# Patient Record
Sex: MALE | Race: BLACK OR AFRICAN AMERICAN | Employment: STUDENT | URBAN - METROPOLITAN AREA
[De-identification: names, ages, dates, MRNs, and addresses within clinical notes are randomized per-mention and may not be internally consistent; named-entity substitution may affect disease eponyms.]

---
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
## 2025-02-22 ENCOUNTER — HOSPITAL ENCOUNTER (EMERGENCY)
Age: 20
Discharge: HOME | End: 2025-02-22
Attending: EMERGENCY MEDICINE
Payer: COMMERCIAL

## 2025-02-22 VITALS
BODY MASS INDEX: 24.67 KG/M2 | HEART RATE: 66 BPM | RESPIRATION RATE: 20 BRPM | HEIGHT: 75 IN | SYSTOLIC BLOOD PRESSURE: 102 MMHG | WEIGHT: 198.41 LBS | OXYGEN SATURATION: 100 % | TEMPERATURE: 98.4 F | DIASTOLIC BLOOD PRESSURE: 70 MMHG

## 2025-02-22 DIAGNOSIS — M54.41 ACUTE RIGHT-SIDED LOW BACK PAIN WITH RIGHT-SIDED SCIATICA: Primary | ICD-10-CM

## 2025-02-22 PROCEDURE — 6370000000 HC RX 637 (ALT 250 FOR IP): Performed by: EMERGENCY MEDICINE

## 2025-02-22 PROCEDURE — 96372 THER/PROPH/DIAG INJ SC/IM: CPT

## 2025-02-22 PROCEDURE — 99284 EMERGENCY DEPT VISIT MOD MDM: CPT

## 2025-02-22 PROCEDURE — 6360000002 HC RX W HCPCS: Performed by: EMERGENCY MEDICINE

## 2025-02-22 RX ORDER — LIDOCAINE 4 G/G
1 PATCH TOPICAL DAILY
Qty: 30 PATCH | Refills: 0 | Status: SHIPPED | OUTPATIENT
Start: 2025-02-22 | End: 2025-03-24

## 2025-02-22 RX ORDER — KETOROLAC TROMETHAMINE 10 MG/1
10 TABLET, FILM COATED ORAL EVERY 6 HOURS PRN
Qty: 20 TABLET | Refills: 0 | Status: ON HOLD | OUTPATIENT
Start: 2025-02-22 | End: 2025-02-28 | Stop reason: HOSPADM

## 2025-02-22 RX ORDER — CYCLOBENZAPRINE HCL 10 MG
10 TABLET ORAL 3 TIMES DAILY PRN
Qty: 21 TABLET | Refills: 0 | Status: SHIPPED | OUTPATIENT
Start: 2025-02-22 | End: 2025-03-04

## 2025-02-22 RX ORDER — LIDOCAINE 4 G/G
1 PATCH TOPICAL DAILY
Status: DISCONTINUED | OUTPATIENT
Start: 2025-02-22 | End: 2025-02-23 | Stop reason: HOSPADM

## 2025-02-22 RX ORDER — LIDOCAINE 4 G/G
1 PATCH TOPICAL DAILY
Status: DISCONTINUED | OUTPATIENT
Start: 2025-02-23 | End: 2025-02-22

## 2025-02-22 RX ORDER — KETOROLAC TROMETHAMINE 30 MG/ML
30 INJECTION, SOLUTION INTRAMUSCULAR; INTRAVENOUS ONCE
Status: COMPLETED | OUTPATIENT
Start: 2025-02-22 | End: 2025-02-22

## 2025-02-22 RX ORDER — ORPHENADRINE CITRATE 30 MG/ML
60 INJECTION INTRAMUSCULAR; INTRAVENOUS ONCE
Status: COMPLETED | OUTPATIENT
Start: 2025-02-22 | End: 2025-02-22

## 2025-02-22 RX ADMIN — KETOROLAC TROMETHAMINE 30 MG: 30 INJECTION, SOLUTION INTRAMUSCULAR at 22:12

## 2025-02-22 RX ADMIN — ORPHENADRINE CITRATE 60 MG: 60 INJECTION INTRAMUSCULAR; INTRAVENOUS at 22:11

## 2025-02-22 ASSESSMENT — PAIN DESCRIPTION - ORIENTATION
ORIENTATION: LOWER
ORIENTATION: LOWER

## 2025-02-22 ASSESSMENT — PAIN - FUNCTIONAL ASSESSMENT: PAIN_FUNCTIONAL_ASSESSMENT: 0-10

## 2025-02-22 ASSESSMENT — PAIN DESCRIPTION - LOCATION
LOCATION: BACK
LOCATION: BACK

## 2025-02-22 ASSESSMENT — PAIN SCALES - GENERAL
PAINLEVEL_OUTOF10: 8
PAINLEVEL_OUTOF10: 10
PAINLEVEL_OUTOF10: 10

## 2025-02-22 ASSESSMENT — PAIN DESCRIPTION - DESCRIPTORS: DESCRIPTORS: SHARP;STABBING

## 2025-02-23 NOTE — ED TRIAGE NOTES
Patient to ED for lower back pain.  Patient is alert and oriented with GCS 15.  Patient reports pain x 3 days after playing basketball, denies any injury during play.  Patient complains that pain is in lower back and radiates down his left leg, no loss of bladder or bowel control since onset.  Patient is unable to describe the pain but rates it \"1000\" on 1/10 scale.  Patient also reports inability to stand or ambulate, but denies any other complaints.

## 2025-02-23 NOTE — ED PROVIDER NOTES
low back pain with right-sided sciatica        Blood pressure 102/70, pulse 66, temperature 98.4 °F (36.9 °C), temperature source Oral, resp. rate 20, height 1.905 m (6' 3\"), weight 90 kg (198 lb 6.6 oz), SpO2 100%.     DISPOSITION  DISPOSITION Decision To Discharge 02/22/2025 10:37:38 PM   DISPOSITION CONDITION Stable         This chart was generated using the Dragon dictation system. I created this record but it may contain dictation errors given the limitations of this technology.        Merlin Michel MD  02/22/25 1220

## 2025-02-25 ENCOUNTER — HOSPITAL ENCOUNTER (EMERGENCY)
Age: 20
Discharge: ANOTHER HOSPITAL | End: 2025-02-26
Attending: STUDENT IN AN ORGANIZED HEALTH CARE EDUCATION/TRAINING PROGRAM

## 2025-02-25 DIAGNOSIS — R20.2 PARESTHESIA OF SADDLE AREA: ICD-10-CM

## 2025-02-25 DIAGNOSIS — R20.2 BILATERAL LEG PARESTHESIA: Primary | ICD-10-CM

## 2025-02-25 PROCEDURE — 6360000002 HC RX W HCPCS: Performed by: STUDENT IN AN ORGANIZED HEALTH CARE EDUCATION/TRAINING PROGRAM

## 2025-02-25 PROCEDURE — 99285 EMERGENCY DEPT VISIT HI MDM: CPT

## 2025-02-25 RX ORDER — DEXAMETHASONE SODIUM PHOSPHATE 4 MG/ML
8 INJECTION, SOLUTION INTRA-ARTICULAR; INTRALESIONAL; INTRAMUSCULAR; INTRAVENOUS; SOFT TISSUE ONCE
Status: COMPLETED | OUTPATIENT
Start: 2025-02-25 | End: 2025-02-25

## 2025-02-25 RX ADMIN — DEXAMETHASONE SODIUM PHOSPHATE 8 MG: 4 INJECTION INTRA-ARTICULAR; INTRALESIONAL; INTRAMUSCULAR; INTRAVENOUS; SOFT TISSUE at 23:48

## 2025-02-25 ASSESSMENT — PAIN DESCRIPTION - PAIN TYPE: TYPE: ACUTE PAIN

## 2025-02-25 ASSESSMENT — PAIN DESCRIPTION - FREQUENCY: FREQUENCY: CONTINUOUS

## 2025-02-25 ASSESSMENT — PAIN - FUNCTIONAL ASSESSMENT
PAIN_FUNCTIONAL_ASSESSMENT: 0-10
PAIN_FUNCTIONAL_ASSESSMENT: PREVENTS OR INTERFERES SOME ACTIVE ACTIVITIES AND ADLS

## 2025-02-25 ASSESSMENT — PAIN DESCRIPTION - ONSET: ONSET: ON-GOING

## 2025-02-25 ASSESSMENT — PAIN DESCRIPTION - LOCATION: LOCATION: LEG

## 2025-02-25 ASSESSMENT — PAIN DESCRIPTION - ORIENTATION: ORIENTATION: LEFT;RIGHT

## 2025-02-25 ASSESSMENT — PAIN DESCRIPTION - DESCRIPTORS: DESCRIPTORS: SHARP

## 2025-02-25 ASSESSMENT — PAIN SCALES - GENERAL: PAINLEVEL_OUTOF10: 10

## 2025-02-26 ENCOUNTER — APPOINTMENT (OUTPATIENT)
Dept: MRI IMAGING | Age: 20
End: 2025-02-26

## 2025-02-26 ENCOUNTER — APPOINTMENT (OUTPATIENT)
Dept: MRI IMAGING | Age: 20
End: 2025-02-26
Attending: INTERNAL MEDICINE
Payer: COMMERCIAL

## 2025-02-26 ENCOUNTER — HOSPITAL ENCOUNTER (INPATIENT)
Age: 20
LOS: 2 days | Discharge: HOME | End: 2025-02-28
Attending: INTERNAL MEDICINE | Admitting: INTERNAL MEDICINE
Payer: COMMERCIAL

## 2025-02-26 VITALS
OXYGEN SATURATION: 100 % | DIASTOLIC BLOOD PRESSURE: 56 MMHG | HEIGHT: 74 IN | WEIGHT: 195.99 LBS | HEART RATE: 57 BPM | BODY MASS INDEX: 25.15 KG/M2 | RESPIRATION RATE: 18 BRPM | SYSTOLIC BLOOD PRESSURE: 123 MMHG | TEMPERATURE: 98.4 F

## 2025-02-26 DIAGNOSIS — Z87.39 S/P DISCECTOMY FOR HERNIATED NUCLEUS PULPOSUS: Primary | ICD-10-CM

## 2025-02-26 DIAGNOSIS — Z98.890 S/P DISCECTOMY FOR HERNIATED NUCLEUS PULPOSUS: Primary | ICD-10-CM

## 2025-02-26 PROBLEM — M54.50 LOW BACK PAIN: Status: ACTIVE | Noted: 2025-02-26

## 2025-02-26 LAB
ABO + RH BLD: NORMAL
ANION GAP SERPL CALCULATED.3IONS-SCNC: 10 MMOL/L (ref 3–16)
ANION GAP SERPL CALCULATED.3IONS-SCNC: 12 MMOL/L (ref 3–16)
BACTERIA UR CULT: NORMAL
BACTERIA URNS QL MICRO: ABNORMAL /HPF
BASOPHILS # BLD: 0 K/UL (ref 0–0.2)
BASOPHILS # BLD: 0.1 K/UL (ref 0–0.2)
BASOPHILS NFR BLD: 0.2 %
BASOPHILS NFR BLD: 1.2 %
BILIRUB UR QL STRIP.AUTO: NEGATIVE
BLD GP AB SCN SERPL QL: NORMAL
BUN SERPL-MCNC: 10 MG/DL (ref 7–20)
BUN SERPL-MCNC: 9 MG/DL (ref 7–20)
CALCIUM SERPL-MCNC: 9.5 MG/DL (ref 8.3–10.6)
CALCIUM SERPL-MCNC: 9.8 MG/DL (ref 8.3–10.6)
CHLORIDE SERPL-SCNC: 100 MMOL/L (ref 99–110)
CHLORIDE SERPL-SCNC: 101 MMOL/L (ref 99–110)
CLARITY UR: CLEAR
CO2 SERPL-SCNC: 24 MMOL/L (ref 21–32)
CO2 SERPL-SCNC: 27 MMOL/L (ref 21–32)
COLOR UR: YELLOW
CREAT SERPL-MCNC: 0.8 MG/DL (ref 0.9–1.3)
CREAT SERPL-MCNC: 0.8 MG/DL (ref 0.9–1.3)
DEPRECATED RDW RBC AUTO: 13 % (ref 12.4–15.4)
DEPRECATED RDW RBC AUTO: 13.3 % (ref 12.4–15.4)
EOSINOPHIL # BLD: 0 K/UL (ref 0–0.6)
EOSINOPHIL # BLD: 0.2 K/UL (ref 0–0.6)
EOSINOPHIL NFR BLD: 0.1 %
EOSINOPHIL NFR BLD: 3.4 %
EPI CELLS #/AREA URNS AUTO: 3 /HPF (ref 0–5)
GFR SERPLBLD CREATININE-BSD FMLA CKD-EPI: >90 ML/MIN/{1.73_M2}
GFR SERPLBLD CREATININE-BSD FMLA CKD-EPI: >90 ML/MIN/{1.73_M2}
GLUCOSE SERPL-MCNC: 100 MG/DL (ref 70–99)
GLUCOSE SERPL-MCNC: 134 MG/DL (ref 70–99)
GLUCOSE UR STRIP.AUTO-MCNC: NEGATIVE MG/DL
HCT VFR BLD AUTO: 44.4 % (ref 40.5–52.5)
HCT VFR BLD AUTO: 48.1 % (ref 40.5–52.5)
HGB BLD-MCNC: 15.1 G/DL (ref 13.5–17.5)
HGB BLD-MCNC: 16.2 G/DL (ref 13.5–17.5)
HGB UR QL STRIP.AUTO: NEGATIVE
HYALINE CASTS #/AREA URNS AUTO: 2 /LPF (ref 0–8)
INR PPP: 1.05 (ref 0.85–1.15)
KETONES UR STRIP.AUTO-MCNC: ABNORMAL MG/DL
LEUKOCYTE ESTERASE UR QL STRIP.AUTO: ABNORMAL
LYMPHOCYTES # BLD: 0.8 K/UL (ref 1–5.1)
LYMPHOCYTES # BLD: 2.2 K/UL (ref 1–5.1)
LYMPHOCYTES NFR BLD: 11.1 %
LYMPHOCYTES NFR BLD: 35 %
MAGNESIUM SERPL-MCNC: 2.31 MG/DL (ref 1.8–2.4)
MCH RBC QN AUTO: 30.6 PG (ref 26–34)
MCH RBC QN AUTO: 30.6 PG (ref 26–34)
MCHC RBC AUTO-ENTMCNC: 33.7 G/DL (ref 31–36)
MCHC RBC AUTO-ENTMCNC: 34 G/DL (ref 31–36)
MCV RBC AUTO: 89.9 FL (ref 80–100)
MCV RBC AUTO: 90.7 FL (ref 80–100)
MONOCYTES # BLD: 0.1 K/UL (ref 0–1.3)
MONOCYTES # BLD: 0.5 K/UL (ref 0–1.3)
MONOCYTES NFR BLD: 2.1 %
MONOCYTES NFR BLD: 8.3 %
NEUTROPHILS # BLD: 3.3 K/UL (ref 1.7–7.7)
NEUTROPHILS # BLD: 6 K/UL (ref 1.7–7.7)
NEUTROPHILS NFR BLD: 52.1 %
NEUTROPHILS NFR BLD: 86.5 %
NITRITE UR QL STRIP.AUTO: NEGATIVE
PH UR STRIP.AUTO: 5.5 [PH] (ref 5–8)
PLATELET # BLD AUTO: 303 K/UL (ref 135–450)
PLATELET # BLD AUTO: 316 K/UL (ref 135–450)
PMV BLD AUTO: 8.7 FL (ref 5–10.5)
PMV BLD AUTO: 8.8 FL (ref 5–10.5)
POTASSIUM SERPL-SCNC: 3.5 MMOL/L (ref 3.5–5.1)
POTASSIUM SERPL-SCNC: 4 MMOL/L (ref 3.5–5.1)
PROT UR STRIP.AUTO-MCNC: 30 MG/DL
PROTHROMBIN TIME: 13.9 SEC (ref 11.9–14.9)
RBC # BLD AUTO: 4.93 M/UL (ref 4.2–5.9)
RBC # BLD AUTO: 5.3 M/UL (ref 4.2–5.9)
RBC CLUMPS #/AREA URNS AUTO: 1 /HPF (ref 0–4)
SODIUM SERPL-SCNC: 137 MMOL/L (ref 136–145)
SODIUM SERPL-SCNC: 137 MMOL/L (ref 136–145)
SP GR UR STRIP.AUTO: 1.02 (ref 1–1.03)
UA COMPLETE W REFLEX CULTURE PNL UR: YES
UA DIPSTICK W REFLEX MICRO PNL UR: YES
URN SPEC COLLECT METH UR: ABNORMAL
UROBILINOGEN UR STRIP-ACNC: 1 E.U./DL
WBC # BLD AUTO: 6.3 K/UL (ref 4–11)
WBC # BLD AUTO: 6.9 K/UL (ref 4–11)
WBC #/AREA URNS AUTO: 22 /HPF (ref 0–5)

## 2025-02-26 PROCEDURE — 96376 TX/PRO/DX INJ SAME DRUG ADON: CPT

## 2025-02-26 PROCEDURE — 81001 URINALYSIS AUTO W/SCOPE: CPT

## 2025-02-26 PROCEDURE — 80048 BASIC METABOLIC PNL TOTAL CA: CPT

## 2025-02-26 PROCEDURE — 36415 COLL VENOUS BLD VENIPUNCTURE: CPT

## 2025-02-26 PROCEDURE — 6360000002 HC RX W HCPCS: Performed by: NURSE PRACTITIONER

## 2025-02-26 PROCEDURE — 1200000000 HC SEMI PRIVATE

## 2025-02-26 PROCEDURE — 86850 RBC ANTIBODY SCREEN: CPT

## 2025-02-26 PROCEDURE — 87086 URINE CULTURE/COLONY COUNT: CPT

## 2025-02-26 PROCEDURE — 72148 MRI LUMBAR SPINE W/O DYE: CPT

## 2025-02-26 PROCEDURE — 85610 PROTHROMBIN TIME: CPT

## 2025-02-26 PROCEDURE — 85025 COMPLETE CBC W/AUTO DIFF WBC: CPT

## 2025-02-26 PROCEDURE — 6360000002 HC RX W HCPCS: Performed by: EMERGENCY MEDICINE

## 2025-02-26 PROCEDURE — 86901 BLOOD TYPING SEROLOGIC RH(D): CPT

## 2025-02-26 PROCEDURE — 83735 ASSAY OF MAGNESIUM: CPT

## 2025-02-26 PROCEDURE — 4500000002 HC ER NO CHARGE

## 2025-02-26 PROCEDURE — 86900 BLOOD TYPING SEROLOGIC ABO: CPT

## 2025-02-26 PROCEDURE — 96375 TX/PRO/DX INJ NEW DRUG ADDON: CPT

## 2025-02-26 PROCEDURE — APPNB180 APP NON BILLABLE TIME > 60 MINS: Performed by: NURSE PRACTITIONER

## 2025-02-26 PROCEDURE — 96374 THER/PROPH/DIAG INJ IV PUSH: CPT

## 2025-02-26 RX ORDER — KETOROLAC TROMETHAMINE 15 MG/ML
15 INJECTION, SOLUTION INTRAMUSCULAR; INTRAVENOUS ONCE
Status: COMPLETED | OUTPATIENT
Start: 2025-02-26 | End: 2025-02-26

## 2025-02-26 RX ORDER — HYDROMORPHONE HYDROCHLORIDE 1 MG/ML
0.5 INJECTION, SOLUTION INTRAMUSCULAR; INTRAVENOUS; SUBCUTANEOUS
Status: COMPLETED | OUTPATIENT
Start: 2025-02-26 | End: 2025-02-26

## 2025-02-26 RX ORDER — METHOCARBAMOL 750 MG/1
750 TABLET, FILM COATED ORAL EVERY 6 HOURS PRN
Status: DISCONTINUED | OUTPATIENT
Start: 2025-02-26 | End: 2025-02-27

## 2025-02-26 RX ORDER — HYDROMORPHONE HYDROCHLORIDE 1 MG/ML
0.5 INJECTION, SOLUTION INTRAMUSCULAR; INTRAVENOUS; SUBCUTANEOUS EVERY 4 HOURS PRN
Status: DISCONTINUED | OUTPATIENT
Start: 2025-02-26 | End: 2025-02-27

## 2025-02-26 RX ORDER — DEXAMETHASONE SODIUM PHOSPHATE 10 MG/ML
10 INJECTION, SOLUTION INTRAMUSCULAR; INTRAVENOUS ONCE
Status: COMPLETED | OUTPATIENT
Start: 2025-02-26 | End: 2025-02-26

## 2025-02-26 RX ADMIN — KETOROLAC TROMETHAMINE 15 MG: 15 INJECTION, SOLUTION INTRAMUSCULAR; INTRAVENOUS at 02:25

## 2025-02-26 RX ADMIN — DEXAMETHASONE SODIUM PHOSPHATE 10 MG: 10 INJECTION, SOLUTION INTRAMUSCULAR; INTRAVENOUS at 13:27

## 2025-02-26 RX ADMIN — HYDROMORPHONE HYDROCHLORIDE 0.75 MG: 1 INJECTION, SOLUTION INTRAMUSCULAR; INTRAVENOUS; SUBCUTANEOUS at 02:44

## 2025-02-26 RX ADMIN — HYDROMORPHONE HYDROCHLORIDE 0.5 MG: 1 INJECTION, SOLUTION INTRAMUSCULAR; INTRAVENOUS; SUBCUTANEOUS at 19:42

## 2025-02-26 RX ADMIN — HYDROMORPHONE HYDROCHLORIDE 0.5 MG: 1 INJECTION, SOLUTION INTRAMUSCULAR; INTRAVENOUS; SUBCUTANEOUS at 04:55

## 2025-02-26 RX ADMIN — HYDROMORPHONE HYDROCHLORIDE 0.5 MG: 1 INJECTION, SOLUTION INTRAMUSCULAR; INTRAVENOUS; SUBCUTANEOUS at 13:27

## 2025-02-26 ASSESSMENT — PAIN - FUNCTIONAL ASSESSMENT
PAIN_FUNCTIONAL_ASSESSMENT: ACTIVITIES ARE NOT PREVENTED
PAIN_FUNCTIONAL_ASSESSMENT: 0-10

## 2025-02-26 ASSESSMENT — PAIN SCALES - GENERAL
PAINLEVEL_OUTOF10: 0
PAINLEVEL_OUTOF10: 7
PAINLEVEL_OUTOF10: 10
PAINLEVEL_OUTOF10: 8
PAINLEVEL_OUTOF10: 2
PAINLEVEL_OUTOF10: 10

## 2025-02-26 ASSESSMENT — PAIN DESCRIPTION - ORIENTATION
ORIENTATION: LOWER;MID
ORIENTATION: RIGHT;LEFT
ORIENTATION: LOWER

## 2025-02-26 ASSESSMENT — PAIN DESCRIPTION - DESCRIPTORS
DESCRIPTORS: NUMBNESS
DESCRIPTORS: TINGLING

## 2025-02-26 ASSESSMENT — PAIN DESCRIPTION - LOCATION
LOCATION: BACK
LOCATION: BACK
LOCATION: LEG

## 2025-02-26 NOTE — DISCHARGE INSTRUCTIONS
Go directly to [facility] emergency department for MRI.  Do not stop at home.  Do not eat or drink prior to getting your test performed

## 2025-02-26 NOTE — ED PROVIDER NOTES
[facility] EMERGENCY DEPARTMENT      EMERGENCY MEDICINE     Pt Name: Cori Rojas  MRN: [id number]  Birthdate 2005  Date of evaluation: 2/25/2025  Provider: Ike Truong DO    CHIEF COMPLAINT       Chief Complaint   Patient presents with    Leg Pain     Pt c/o bilateral leg pain, numbness and tingling from waist to below the knee. Pt was here 3 days ago for back pain and was given muscle relaxer, and lidocaine patch. Pt states that he was not numb and had any tingling when he came 3 days ago. Pt rates pain 10/10 at this time.      HISTORY OF PRESENT ILLNESS   Cori Rojas is a 19 y.o. male who presents to the emergency department for bilateral paresthesias from his waist to his knees as well as inability to feel himself using the restroom.  Patient was seen approximately 3 days ago after having acute onset low back pain while lifting weights.  He was diagnosed with sciatica and had no red flag symptoms at that time.  He states that yesterday his symptoms started presenting the way that they were today.  He has been taking his multimodal's as indicated.  Denies any direct pain on the back.  States his legs feel like they are falling asleep.  No other medical history.  No other complaints at this time        PASTMEDICAL HISTORY     Past Medical History:   Diagnosis Date    Asthma     High risk sexual behavior        There is no problem list on file for this patient.    SURGICAL HISTORY     History reviewed. No pertinent surgical history.    CURRENT MEDICATIONS       Previous Medications    CYCLOBENZAPRINE (FLEXERIL) 10 MG TABLET    Take 1 tablet by mouth 3 times daily as needed for Muscle spasms    KETOROLAC (TORADOL) 10 MG TABLET    Take 1 tablet by mouth every 6 hours as needed for Pain    LIDOCAINE 4 % EXTERNAL PATCH    Place 1 patch onto the skin daily       ALLERGIES     is allergic to grass pollen(k-o-r-t-swt georgi) and shrimp extract.    FAMILY HISTORY     has no family status information on

## 2025-02-26 NOTE — ED TRIAGE NOTES
Pt c/o bilateral leg pain, numbness and tingling from waist to below the knee. Pt was here 3 days ago for back pain and was given muscle relaxer, and lidocaine patch. Pt states that he was not numb and had any tingling when he came 3 days ago. Pt rates pain 10/10 at this time. Pt is A&O x4 and ind. VSS at this time. Popliteal and femoral arteries are palpable at this time. Pt is on his way to [facility] for an MRI. Pt is being transported by an uber. No IV was placed.

## 2025-02-26 NOTE — CONSULTS
NEUROSURGERY CONSULT  MARK ANTHONY MAI  [id number]   2005 2/26/2025    Requesting physician: Sriram Antony MD    Reason for consultation: disc herniation     History of present illness: Patient is a 19 y.o. male w/ PMH of asthma who presented on 2/26/2025 with back pain and radiculopathy. Pain started last Wednesday after he played basketball and lifted weights. At that time he had midline back pain only. On Friday he played basketball again and noticed worsening of his pain. The pain started radiating to his buttocks and down the back of both of his legs, with numbness in the same distrubution. He was seen in the ED on 2/22 and given muscle relaxants and toradol which have provided him with little relief. He currently complains of back/leg pain worse when lying flat, he has numbness to buttocks, penis and the back of his legs which stops at his knees. He denies urinary or fecal incontinence. He came back for evaluation overnight, an MRI revealed large L5-S1 disc herniation w/ severe canal stenosis. Neurosurgery consulted for recommendations.     ROS:   GENERAL:  Denies fever or recent illness. Denies weight changes   EYES:  Denies vision change or diplopia  EARS:  Denies hearing loss  CARDIAC:  Denies chest pain  RESPIRATORY:  Denies shortness of breath  SKIN:  Denies rash or lesions   HEM:  Denies excessive bruising  PSYCH:  Denies anxiety or depression  NEURO: +numbness, +saddle anesthesia   :  Denies urinary difficulty  GI: Denies nausea, vomiting, diarrhea or constipation  MUSCULOSKELETAL:  +back pain, + leg pain     Allergies   Allergen Reactions    Grass Pollen(K-O-R-T-Swt Neel)     Shrimp Extract Hives       Past Medical History:   Diagnosis Date    Asthma     High risk sexual behavior         No past surgical history on file.    Social History     Occupational History    Not on file   Tobacco Use    Smoking status: Never    Smokeless tobacco: Never   Vaping Use    Vaping status: Never Used

## 2025-02-26 NOTE — ED PROVIDER NOTES
Emergency Department Encounter  Location: [facility] EMERGENCY DEPARTMENT    Patient: Cori Rojas  MRN: [id number]  : 2005  Date of evaluation: 2025  ED Provider: Han Verma MD    12:44 AM  25    Cori Rojas was checked out to me by Dr. Truong. Please see his/her initial documentation for details of the patient's initial ED presentation, physical exam and completed studies.    In brief, Cori Rojas is a 19 y.o. male that presented to the emergency department for evaluation of paresthesias and sensory deficits to the bilateral lower extremities.  Reports injuring his back 3 days previously lifting heavy objects.  He states that he is developed what he describes as paresthesias or pins-and-needles and a girdle like sensation with pain in his buttocks and the posterior legs.  Denies any weakness and states he is ambulate difficulties.  He denies incontinence of bowel or urine but states that \"everything feels asleep down there\".  Patient sent this facility for MRI to rule out cauda equina .    I have reviewed and interpreted all of the currently available lab results and diagnostics from this visit:  Results for orders placed or performed during the hospital encounter of 25   Culture, Urine    Specimen: Urine, clean catch   Result Value Ref Range    Urine Culture, Routine No growth at 18 to 36 hours    CBC with Auto Differential   Result Value Ref Range    WBC 6.3 4.0 - 11.0 K/uL    RBC 4.93 4.20 - 5.90 M/uL    Hemoglobin 15.1 13.5 - 17.5 g/dL    Hematocrit 44.4 40.5 - 52.5 %    MCV 89.9 80.0 - 100.0 fL    MCH 30.6 26.0 - 34.0 pg    MCHC 34.0 31.0 - 36.0 g/dL    RDW 13.3 12.4 - 15.4 %    Platelets 316 135 - 450 K/uL    MPV 8.7 5.0 - 10.5 fL    Neutrophils % 52.1 %    Lymphocytes % 35.0 %    Monocytes % 8.3 %    Eosinophils % 3.4 %    Basophils % 1.2 %    Neutrophils Absolute 3.3 1.7 - 7.7 K/uL    Lymphocytes Absolute 2.2 1.0 - 5.1 K/uL    Monocytes Absolute 0.5 0.0 - 1.3 K/uL

## 2025-02-26 NOTE — ED NOTES
Report given to transport team at this time. All questions and concerns addressed. Pt placed on transport stretcher. Pt tolerated well.

## 2025-02-27 ENCOUNTER — ANESTHESIA (OUTPATIENT)
Dept: OPERATING ROOM | Age: 20
End: 2025-02-27
Payer: COMMERCIAL

## 2025-02-27 ENCOUNTER — APPOINTMENT (OUTPATIENT)
Dept: GENERAL RADIOLOGY | Age: 20
End: 2025-02-27
Attending: INTERNAL MEDICINE
Payer: COMMERCIAL

## 2025-02-27 ENCOUNTER — ANESTHESIA EVENT (OUTPATIENT)
Dept: OPERATING ROOM | Age: 20
End: 2025-02-27
Payer: COMMERCIAL

## 2025-02-27 PROBLEM — M51.26 LUMBAR DISC HERNIATION: Status: ACTIVE | Noted: 2025-02-27

## 2025-02-27 PROCEDURE — 72110 X-RAY EXAM L-2 SPINE 4/>VWS: CPT

## 2025-02-27 PROCEDURE — 2500000003 HC RX 250 WO HCPCS

## 2025-02-27 PROCEDURE — 01NB0ZZ RELEASE LUMBAR NERVE, OPEN APPROACH: ICD-10-PCS | Performed by: NEUROLOGICAL SURGERY

## 2025-02-27 PROCEDURE — 3700000001 HC ADD 15 MINUTES (ANESTHESIA): Performed by: NEUROLOGICAL SURGERY

## 2025-02-27 PROCEDURE — 2720000010 HC SURG SUPPLY STERILE: Performed by: NEUROLOGICAL SURGERY

## 2025-02-27 PROCEDURE — 2580000003 HC RX 258: Performed by: NURSE PRACTITIONER

## 2025-02-27 PROCEDURE — 3600000004 HC SURGERY LEVEL 4 BASE: Performed by: NEUROLOGICAL SURGERY

## 2025-02-27 PROCEDURE — 2580000003 HC RX 258: Performed by: NEUROLOGICAL SURGERY

## 2025-02-27 PROCEDURE — 6370000000 HC RX 637 (ALT 250 FOR IP): Performed by: NEUROLOGICAL SURGERY

## 2025-02-27 PROCEDURE — 6360000002 HC RX W HCPCS: Performed by: NURSE PRACTITIONER

## 2025-02-27 PROCEDURE — 3700000000 HC ANESTHESIA ATTENDED CARE: Performed by: NEUROLOGICAL SURGERY

## 2025-02-27 PROCEDURE — 3600000014 HC SURGERY LEVEL 4 ADDTL 15MIN: Performed by: NEUROLOGICAL SURGERY

## 2025-02-27 PROCEDURE — 2709999900 HC NON-CHARGEABLE SUPPLY: Performed by: NEUROLOGICAL SURGERY

## 2025-02-27 PROCEDURE — 7100000001 HC PACU RECOVERY - ADDTL 15 MIN: Performed by: NEUROLOGICAL SURGERY

## 2025-02-27 PROCEDURE — 7100000000 HC PACU RECOVERY - FIRST 15 MIN: Performed by: NEUROLOGICAL SURGERY

## 2025-02-27 PROCEDURE — 1200000000 HC SEMI PRIVATE

## 2025-02-27 PROCEDURE — 6360000002 HC RX W HCPCS: Performed by: NEUROLOGICAL SURGERY

## 2025-02-27 PROCEDURE — APPNB180 APP NON BILLABLE TIME > 60 MINS: Performed by: NURSE PRACTITIONER

## 2025-02-27 PROCEDURE — 72100 X-RAY EXAM L-S SPINE 2/3 VWS: CPT

## 2025-02-27 PROCEDURE — 2500000003 HC RX 250 WO HCPCS: Performed by: NEUROLOGICAL SURGERY

## 2025-02-27 PROCEDURE — 6360000002 HC RX W HCPCS

## 2025-02-27 PROCEDURE — 0SB40ZZ EXCISION OF LUMBOSACRAL DISC, OPEN APPROACH: ICD-10-PCS | Performed by: NEUROLOGICAL SURGERY

## 2025-02-27 RX ORDER — POTASSIUM CHLORIDE 1500 MG/1
40 TABLET, EXTENDED RELEASE ORAL PRN
Status: DISCONTINUED | OUTPATIENT
Start: 2025-02-27 | End: 2025-03-01 | Stop reason: HOSPADM

## 2025-02-27 RX ORDER — HYDROMORPHONE HYDROCHLORIDE 1 MG/ML
1 INJECTION, SOLUTION INTRAMUSCULAR; INTRAVENOUS; SUBCUTANEOUS
Status: DISCONTINUED | OUTPATIENT
Start: 2025-02-27 | End: 2025-03-01 | Stop reason: HOSPADM

## 2025-02-27 RX ORDER — DEXAMETHASONE 4 MG/1
4 TABLET ORAL EVERY 8 HOURS SCHEDULED
Status: DISCONTINUED | OUTPATIENT
Start: 2025-03-01 | End: 2025-03-01 | Stop reason: HOSPADM

## 2025-02-27 RX ORDER — SODIUM CHLORIDE 0.9 % (FLUSH) 0.9 %
5-40 SYRINGE (ML) INJECTION EVERY 12 HOURS SCHEDULED
Status: DISCONTINUED | OUTPATIENT
Start: 2025-02-27 | End: 2025-02-27 | Stop reason: HOSPADM

## 2025-02-27 RX ORDER — SODIUM CHLORIDE 0.9 % (FLUSH) 0.9 %
5-40 SYRINGE (ML) INJECTION EVERY 12 HOURS SCHEDULED
Status: DISCONTINUED | OUTPATIENT
Start: 2025-02-27 | End: 2025-02-27

## 2025-02-27 RX ORDER — DEXAMETHASONE 4 MG/1
4 TABLET ORAL EVERY 6 HOURS SCHEDULED
Status: DISCONTINUED | OUTPATIENT
Start: 2025-02-27 | End: 2025-03-01 | Stop reason: HOSPADM

## 2025-02-27 RX ORDER — SODIUM CHLORIDE 0.9 % (FLUSH) 0.9 %
5-40 SYRINGE (ML) INJECTION PRN
Status: DISCONTINUED | OUTPATIENT
Start: 2025-02-27 | End: 2025-02-27

## 2025-02-27 RX ORDER — METHOCARBAMOL 750 MG/1
750 TABLET, FILM COATED ORAL EVERY 6 HOURS PRN
Status: DISCONTINUED | OUTPATIENT
Start: 2025-02-28 | End: 2025-03-01 | Stop reason: HOSPADM

## 2025-02-27 RX ORDER — ONDANSETRON 4 MG/1
4 TABLET, ORALLY DISINTEGRATING ORAL EVERY 8 HOURS PRN
Status: DISCONTINUED | OUTPATIENT
Start: 2025-02-27 | End: 2025-02-27

## 2025-02-27 RX ORDER — SODIUM CHLORIDE 9 MG/ML
INJECTION, SOLUTION INTRAVENOUS CONTINUOUS
Status: DISCONTINUED | OUTPATIENT
Start: 2025-02-27 | End: 2025-02-28

## 2025-02-27 RX ORDER — SODIUM CHLORIDE, SODIUM LACTATE, POTASSIUM CHLORIDE, CALCIUM CHLORIDE 600; 310; 30; 20 MG/100ML; MG/100ML; MG/100ML; MG/100ML
INJECTION, SOLUTION INTRAVENOUS CONTINUOUS
Status: DISCONTINUED | OUTPATIENT
Start: 2025-02-27 | End: 2025-02-27

## 2025-02-27 RX ORDER — ACETAMINOPHEN 650 MG/1
650 SUPPOSITORY RECTAL EVERY 6 HOURS PRN
Status: DISCONTINUED | OUTPATIENT
Start: 2025-02-27 | End: 2025-02-27

## 2025-02-27 RX ORDER — SENNA AND DOCUSATE SODIUM 50; 8.6 MG/1; MG/1
1 TABLET, FILM COATED ORAL 2 TIMES DAILY
Status: DISCONTINUED | OUTPATIENT
Start: 2025-02-27 | End: 2025-03-01 | Stop reason: HOSPADM

## 2025-02-27 RX ORDER — METHYLPREDNISOLONE ACETATE 80 MG/ML
INJECTION, SUSPENSION INTRA-ARTICULAR; INTRALESIONAL; INTRAMUSCULAR; SOFT TISSUE PRN
Status: DISCONTINUED | OUTPATIENT
Start: 2025-02-27 | End: 2025-02-27 | Stop reason: ALTCHOICE

## 2025-02-27 RX ORDER — DEXAMETHASONE SODIUM PHOSPHATE 4 MG/ML
INJECTION, SOLUTION INTRA-ARTICULAR; INTRALESIONAL; INTRAMUSCULAR; INTRAVENOUS; SOFT TISSUE
Status: DISCONTINUED | OUTPATIENT
Start: 2025-02-27 | End: 2025-02-27 | Stop reason: SDUPTHER

## 2025-02-27 RX ORDER — FENTANYL CITRATE 50 UG/ML
25 INJECTION, SOLUTION INTRAMUSCULAR; INTRAVENOUS EVERY 5 MIN PRN
Status: DISCONTINUED | OUTPATIENT
Start: 2025-02-27 | End: 2025-02-27 | Stop reason: HOSPADM

## 2025-02-27 RX ORDER — NALOXONE HYDROCHLORIDE 0.4 MG/ML
INJECTION, SOLUTION INTRAMUSCULAR; INTRAVENOUS; SUBCUTANEOUS PRN
Status: DISCONTINUED | OUTPATIENT
Start: 2025-02-27 | End: 2025-02-27 | Stop reason: HOSPADM

## 2025-02-27 RX ORDER — OXYCODONE HYDROCHLORIDE 5 MG/1
5 TABLET ORAL EVERY 4 HOURS PRN
Status: DISCONTINUED | OUTPATIENT
Start: 2025-02-27 | End: 2025-03-01 | Stop reason: HOSPADM

## 2025-02-27 RX ORDER — ACETAMINOPHEN 325 MG/1
650 TABLET ORAL EVERY 6 HOURS PRN
Status: DISCONTINUED | OUTPATIENT
Start: 2025-02-27 | End: 2025-02-27

## 2025-02-27 RX ORDER — SODIUM CHLORIDE 0.9 % (FLUSH) 0.9 %
5-40 SYRINGE (ML) INJECTION PRN
Status: DISCONTINUED | OUTPATIENT
Start: 2025-02-27 | End: 2025-03-01 | Stop reason: HOSPADM

## 2025-02-27 RX ORDER — CEFAZOLIN SODIUM 1 G/3ML
INJECTION, POWDER, FOR SOLUTION INTRAMUSCULAR; INTRAVENOUS
Status: DISCONTINUED | OUTPATIENT
Start: 2025-02-27 | End: 2025-02-27 | Stop reason: SDUPTHER

## 2025-02-27 RX ORDER — PROMETHAZINE HYDROCHLORIDE 12.5 MG/1
12.5 TABLET ORAL EVERY 6 HOURS PRN
Status: DISCONTINUED | OUTPATIENT
Start: 2025-02-27 | End: 2025-03-01 | Stop reason: HOSPADM

## 2025-02-27 RX ORDER — ONDANSETRON 2 MG/ML
4 INJECTION INTRAMUSCULAR; INTRAVENOUS EVERY 6 HOURS PRN
Status: DISCONTINUED | OUTPATIENT
Start: 2025-02-27 | End: 2025-02-27

## 2025-02-27 RX ORDER — MAGNESIUM SULFATE IN WATER 40 MG/ML
2000 INJECTION, SOLUTION INTRAVENOUS PRN
Status: DISCONTINUED | OUTPATIENT
Start: 2025-02-27 | End: 2025-02-27

## 2025-02-27 RX ORDER — KETAMINE HCL IN NACL, ISO-OSM 20 MG/2 ML
SYRINGE (ML) INJECTION
Status: DISCONTINUED | OUTPATIENT
Start: 2025-02-27 | End: 2025-02-27 | Stop reason: SDUPTHER

## 2025-02-27 RX ORDER — ONDANSETRON 2 MG/ML
INJECTION INTRAMUSCULAR; INTRAVENOUS
Status: DISCONTINUED | OUTPATIENT
Start: 2025-02-27 | End: 2025-02-27 | Stop reason: SDUPTHER

## 2025-02-27 RX ORDER — PROCHLORPERAZINE EDISYLATE 5 MG/ML
5 INJECTION INTRAMUSCULAR; INTRAVENOUS
Status: DISCONTINUED | OUTPATIENT
Start: 2025-02-27 | End: 2025-02-27 | Stop reason: HOSPADM

## 2025-02-27 RX ORDER — OXYCODONE HYDROCHLORIDE 5 MG/1
10 TABLET ORAL EVERY 4 HOURS PRN
Status: DISCONTINUED | OUTPATIENT
Start: 2025-02-27 | End: 2025-03-01 | Stop reason: HOSPADM

## 2025-02-27 RX ORDER — LABETALOL HYDROCHLORIDE 5 MG/ML
10 INJECTION, SOLUTION INTRAVENOUS
Status: DISCONTINUED | OUTPATIENT
Start: 2025-02-27 | End: 2025-02-27 | Stop reason: HOSPADM

## 2025-02-27 RX ORDER — BUPIVACAINE HYDROCHLORIDE 5 MG/ML
INJECTION, SOLUTION EPIDURAL; INTRACAUDAL PRN
Status: DISCONTINUED | OUTPATIENT
Start: 2025-02-27 | End: 2025-02-27 | Stop reason: ALTCHOICE

## 2025-02-27 RX ORDER — DEXAMETHASONE 4 MG/1
4 TABLET ORAL DAILY
Status: DISCONTINUED | OUTPATIENT
Start: 2025-03-06 | End: 2025-03-01 | Stop reason: HOSPADM

## 2025-02-27 RX ORDER — OXYCODONE HYDROCHLORIDE 5 MG/1
5 TABLET ORAL PRN
Status: DISCONTINUED | OUTPATIENT
Start: 2025-02-27 | End: 2025-02-27 | Stop reason: HOSPADM

## 2025-02-27 RX ORDER — SENNOSIDES A AND B 8.6 MG/1
1 TABLET, FILM COATED ORAL DAILY PRN
Status: DISCONTINUED | OUTPATIENT
Start: 2025-02-27 | End: 2025-02-27

## 2025-02-27 RX ORDER — ONDANSETRON 2 MG/ML
4 INJECTION INTRAMUSCULAR; INTRAVENOUS EVERY 6 HOURS PRN
Status: DISCONTINUED | OUTPATIENT
Start: 2025-02-27 | End: 2025-03-01 | Stop reason: HOSPADM

## 2025-02-27 RX ORDER — SODIUM CHLORIDE 0.9 % (FLUSH) 0.9 %
5-40 SYRINGE (ML) INJECTION EVERY 12 HOURS SCHEDULED
Status: DISCONTINUED | OUTPATIENT
Start: 2025-02-27 | End: 2025-03-01 | Stop reason: HOSPADM

## 2025-02-27 RX ORDER — OXYCODONE HYDROCHLORIDE 5 MG/1
10 TABLET ORAL PRN
Status: DISCONTINUED | OUTPATIENT
Start: 2025-02-27 | End: 2025-02-27 | Stop reason: HOSPADM

## 2025-02-27 RX ORDER — ENOXAPARIN SODIUM 100 MG/ML
40 INJECTION SUBCUTANEOUS DAILY
Status: DISCONTINUED | OUTPATIENT
Start: 2025-02-27 | End: 2025-02-27

## 2025-02-27 RX ORDER — HYDROMORPHONE HYDROCHLORIDE 1 MG/ML
0.5 INJECTION, SOLUTION INTRAMUSCULAR; INTRAVENOUS; SUBCUTANEOUS EVERY 5 MIN PRN
Status: DISCONTINUED | OUTPATIENT
Start: 2025-02-27 | End: 2025-02-27 | Stop reason: HOSPADM

## 2025-02-27 RX ORDER — NALOXONE HYDROCHLORIDE 0.4 MG/ML
0.2 INJECTION, SOLUTION INTRAMUSCULAR; INTRAVENOUS; SUBCUTANEOUS PRN
Status: DISCONTINUED | OUTPATIENT
Start: 2025-02-27 | End: 2025-03-01 | Stop reason: HOSPADM

## 2025-02-27 RX ORDER — DEXAMETHASONE 4 MG/1
4 TABLET ORAL EVERY 12 HOURS SCHEDULED
Status: DISCONTINUED | OUTPATIENT
Start: 2025-03-03 | End: 2025-03-01 | Stop reason: HOSPADM

## 2025-02-27 RX ORDER — SODIUM CHLORIDE 9 MG/ML
INJECTION, SOLUTION INTRAVENOUS PRN
Status: DISCONTINUED | OUTPATIENT
Start: 2025-02-27 | End: 2025-03-01 | Stop reason: HOSPADM

## 2025-02-27 RX ORDER — POTASSIUM CHLORIDE 7.45 MG/ML
10 INJECTION INTRAVENOUS PRN
Status: DISCONTINUED | OUTPATIENT
Start: 2025-02-27 | End: 2025-03-01 | Stop reason: HOSPADM

## 2025-02-27 RX ORDER — LIDOCAINE HYDROCHLORIDE 20 MG/ML
INJECTION, SOLUTION INTRAVENOUS
Status: DISCONTINUED | OUTPATIENT
Start: 2025-02-27 | End: 2025-02-27 | Stop reason: SDUPTHER

## 2025-02-27 RX ORDER — HYDROMORPHONE HYDROCHLORIDE 2 MG/ML
INJECTION, SOLUTION INTRAMUSCULAR; INTRAVENOUS; SUBCUTANEOUS
Status: DISCONTINUED | OUTPATIENT
Start: 2025-02-27 | End: 2025-02-27 | Stop reason: SDUPTHER

## 2025-02-27 RX ORDER — HEPARIN SODIUM 5000 [USP'U]/ML
5000 INJECTION, SOLUTION INTRAVENOUS; SUBCUTANEOUS EVERY 8 HOURS SCHEDULED
Status: DISCONTINUED | OUTPATIENT
Start: 2025-02-28 | End: 2025-03-01 | Stop reason: HOSPADM

## 2025-02-27 RX ORDER — PROPOFOL 10 MG/ML
INJECTION, EMULSION INTRAVENOUS
Status: DISCONTINUED | OUTPATIENT
Start: 2025-02-27 | End: 2025-02-27 | Stop reason: SDUPTHER

## 2025-02-27 RX ORDER — MIDAZOLAM HYDROCHLORIDE 1 MG/ML
INJECTION, SOLUTION INTRAMUSCULAR; INTRAVENOUS
Status: DISCONTINUED | OUTPATIENT
Start: 2025-02-27 | End: 2025-02-27 | Stop reason: SDUPTHER

## 2025-02-27 RX ORDER — DIAZEPAM 5 MG/1
5 TABLET ORAL EVERY 6 HOURS PRN
Status: DISCONTINUED | OUTPATIENT
Start: 2025-02-27 | End: 2025-03-01 | Stop reason: HOSPADM

## 2025-02-27 RX ORDER — MAGNESIUM HYDROXIDE/ALUMINUM HYDROXICE/SIMETHICONE 120; 1200; 1200 MG/30ML; MG/30ML; MG/30ML
15 SUSPENSION ORAL EVERY 6 HOURS PRN
Status: DISCONTINUED | OUTPATIENT
Start: 2025-02-27 | End: 2025-03-01 | Stop reason: HOSPADM

## 2025-02-27 RX ORDER — SODIUM CHLORIDE 9 MG/ML
INJECTION, SOLUTION INTRAVENOUS PRN
Status: DISCONTINUED | OUTPATIENT
Start: 2025-02-27 | End: 2025-02-27 | Stop reason: HOSPADM

## 2025-02-27 RX ORDER — BISACODYL 10 MG
10 SUPPOSITORY, RECTAL RECTAL DAILY PRN
Status: DISCONTINUED | OUTPATIENT
Start: 2025-02-27 | End: 2025-03-01 | Stop reason: HOSPADM

## 2025-02-27 RX ORDER — SODIUM CHLORIDE 0.9 % (FLUSH) 0.9 %
5-40 SYRINGE (ML) INJECTION PRN
Status: DISCONTINUED | OUTPATIENT
Start: 2025-02-27 | End: 2025-02-27 | Stop reason: HOSPADM

## 2025-02-27 RX ORDER — ROCURONIUM BROMIDE 10 MG/ML
INJECTION, SOLUTION INTRAVENOUS
Status: DISCONTINUED | OUTPATIENT
Start: 2025-02-27 | End: 2025-02-27 | Stop reason: SDUPTHER

## 2025-02-27 RX ORDER — SODIUM CHLORIDE 9 MG/ML
INJECTION, SOLUTION INTRAVENOUS PRN
Status: DISCONTINUED | OUTPATIENT
Start: 2025-02-27 | End: 2025-02-27

## 2025-02-27 RX ORDER — GLYCOPYRROLATE 0.2 MG/ML
INJECTION INTRAMUSCULAR; INTRAVENOUS
Status: DISCONTINUED | OUTPATIENT
Start: 2025-02-27 | End: 2025-02-27 | Stop reason: SDUPTHER

## 2025-02-27 RX ORDER — METHOCARBAMOL 100 MG/ML
INJECTION, SOLUTION INTRAMUSCULAR; INTRAVENOUS
Status: DISCONTINUED | OUTPATIENT
Start: 2025-02-27 | End: 2025-02-27 | Stop reason: SDUPTHER

## 2025-02-27 RX ORDER — POLYETHYLENE GLYCOL 3350 17 G/17G
17 POWDER, FOR SOLUTION ORAL DAILY
Status: DISCONTINUED | OUTPATIENT
Start: 2025-02-27 | End: 2025-03-01 | Stop reason: HOSPADM

## 2025-02-27 RX ORDER — SODIUM CHLORIDE 9 MG/ML
INJECTION, SOLUTION INTRAVENOUS CONTINUOUS
Status: DISCONTINUED | OUTPATIENT
Start: 2025-02-27 | End: 2025-02-27

## 2025-02-27 RX ORDER — HYDROMORPHONE HYDROCHLORIDE 1 MG/ML
0.5 INJECTION, SOLUTION INTRAMUSCULAR; INTRAVENOUS; SUBCUTANEOUS
Status: DISCONTINUED | OUTPATIENT
Start: 2025-02-27 | End: 2025-03-01 | Stop reason: HOSPADM

## 2025-02-27 RX ORDER — METHOCARBAMOL 100 MG/ML
1000 INJECTION, SOLUTION INTRAMUSCULAR; INTRAVENOUS EVERY 8 HOURS
Status: DISPENSED | OUTPATIENT
Start: 2025-02-27 | End: 2025-02-28

## 2025-02-27 RX ADMIN — LIDOCAINE HYDROCHLORIDE 100 MG: 20 INJECTION, SOLUTION INTRAVENOUS at 16:13

## 2025-02-27 RX ADMIN — GLYCOPYRROLATE 0.2 MG: 0.2 INJECTION INTRAMUSCULAR; INTRAVENOUS at 16:34

## 2025-02-27 RX ADMIN — ONDANSETRON 4 MG: 2 INJECTION INTRAMUSCULAR; INTRAVENOUS at 18:30

## 2025-02-27 RX ADMIN — SENNOSIDES AND DOCUSATE SODIUM 1 TABLET: 50; 8.6 TABLET ORAL at 21:55

## 2025-02-27 RX ADMIN — SODIUM CHLORIDE, POTASSIUM CHLORIDE, SODIUM LACTATE AND CALCIUM CHLORIDE: 600; 310; 30; 20 INJECTION, SOLUTION INTRAVENOUS at 18:14

## 2025-02-27 RX ADMIN — SODIUM CHLORIDE: 0.9 INJECTION, SOLUTION INTRAVENOUS at 21:57

## 2025-02-27 RX ADMIN — HYDROMORPHONE HYDROCHLORIDE 0.5 MG: 1 INJECTION, SOLUTION INTRAMUSCULAR; INTRAVENOUS; SUBCUTANEOUS at 09:49

## 2025-02-27 RX ADMIN — DEXAMETHASONE SODIUM PHOSPHATE 4 MG: 4 INJECTION INTRA-ARTICULAR; INTRALESIONAL; INTRAMUSCULAR; INTRAVENOUS; SOFT TISSUE at 16:19

## 2025-02-27 RX ADMIN — DEXAMETHASONE 4 MG: 4 TABLET ORAL at 21:55

## 2025-02-27 RX ADMIN — Medication 20 MG: at 16:40

## 2025-02-27 RX ADMIN — METHOCARBAMOL 250 MG: 100 INJECTION, SOLUTION INTRAMUSCULAR; INTRAVENOUS at 18:30

## 2025-02-27 RX ADMIN — METHOCARBAMOL 1000 MG: 100 INJECTION INTRAMUSCULAR; INTRAVENOUS at 21:55

## 2025-02-27 RX ADMIN — CEFAZOLIN SODIUM 2 G: 1 POWDER, FOR SOLUTION INTRAMUSCULAR; INTRAVENOUS at 16:23

## 2025-02-27 RX ADMIN — ROCURONIUM BROMIDE 50 MG: 10 INJECTION, SOLUTION INTRAVENOUS at 16:13

## 2025-02-27 RX ADMIN — ROCURONIUM BROMIDE 20 MG: 10 INJECTION, SOLUTION INTRAVENOUS at 17:09

## 2025-02-27 RX ADMIN — METHOCARBAMOL 500 MG: 100 INJECTION, SOLUTION INTRAMUSCULAR; INTRAVENOUS at 16:40

## 2025-02-27 RX ADMIN — ROCURONIUM BROMIDE 20 MG: 10 INJECTION, SOLUTION INTRAVENOUS at 17:35

## 2025-02-27 RX ADMIN — SODIUM CHLORIDE, POTASSIUM CHLORIDE, SODIUM LACTATE AND CALCIUM CHLORIDE: 600; 310; 30; 20 INJECTION, SOLUTION INTRAVENOUS at 15:11

## 2025-02-27 RX ADMIN — HYDROMORPHONE HYDROCHLORIDE 1 MG: 2 INJECTION, SOLUTION INTRAMUSCULAR; INTRAVENOUS; SUBCUTANEOUS at 16:11

## 2025-02-27 RX ADMIN — MIDAZOLAM HYDROCHLORIDE 2 MG: 1 INJECTION, SOLUTION INTRAMUSCULAR; INTRAVENOUS at 16:11

## 2025-02-27 RX ADMIN — PROPOFOL 200 MG: 10 INJECTION, EMULSION INTRAVENOUS at 16:13

## 2025-02-27 RX ADMIN — DEXAMETHASONE SODIUM PHOSPHATE 4 MG: 4 INJECTION INTRA-ARTICULAR; INTRALESIONAL; INTRAMUSCULAR; INTRAVENOUS; SOFT TISSUE at 18:05

## 2025-02-27 RX ADMIN — HYDROMORPHONE HYDROCHLORIDE 0.5 MG: 2 INJECTION, SOLUTION INTRAMUSCULAR; INTRAVENOUS; SUBCUTANEOUS at 16:58

## 2025-02-27 RX ADMIN — SODIUM CHLORIDE: 0.9 INJECTION, SOLUTION INTRAVENOUS at 09:54

## 2025-02-27 RX ADMIN — SODIUM CHLORIDE, PRESERVATIVE FREE 10 ML: 5 INJECTION INTRAVENOUS at 21:57

## 2025-02-27 RX ADMIN — POLYETHYLENE GLYCOL 3350 17 G: 17 POWDER, FOR SOLUTION ORAL at 21:55

## 2025-02-27 RX ADMIN — ONDANSETRON 4 MG: 2 INJECTION INTRAMUSCULAR; INTRAVENOUS at 16:19

## 2025-02-27 RX ADMIN — ROCURONIUM BROMIDE 30 MG: 10 INJECTION, SOLUTION INTRAVENOUS at 16:41

## 2025-02-27 RX ADMIN — METHOCARBAMOL 250 MG: 100 INJECTION, SOLUTION INTRAMUSCULAR; INTRAVENOUS at 18:11

## 2025-02-27 ASSESSMENT — PAIN SCALES - GENERAL
PAINLEVEL_OUTOF10: 0
PAINLEVEL_OUTOF10: 8

## 2025-02-27 NOTE — CARE COORDINATION
Patient going to surgery today: LUMBAR 5 - SACRAL 1 CENTRAL LAMINECTOMY AND DISCECTOMY. Expected to discharge to the ortho/neuro floor thereafter. CM will continue to follow patient until discharge.  Electronically signed by Lea Rizvi RN on 2/27/2025 at 11:40 AM

## 2025-02-27 NOTE — PROGRESS NOTES
Patient admitted to room 3314. Report received from RN via telephone. VSS on room air. Patient oriented to room and call light. Rights and responsibilities provided to patient, and welcome packet provided to patient. 4 eyes skin assessment completed with 2nd RN.

## 2025-02-27 NOTE — CARE COORDINATION
Case Management Assessment  Initial Evaluation    Date/Time of Evaluation: 2/27/2025 12:07 PM  Assessment Completed by: Lea Rizvi RN    If patient is discharged prior to next notation, then this note serves as note for discharge by case management.    Patient Name: Cori Rojas                   YOB: 2005  Diagnosis: Low back pain [M54.50]  Lumbar disc herniation [M51.26]                   Date / Time: 2/26/2025  9:40 AM    Patient Admission Status: Inpatient   Readmission Risk (Low < 19, Mod (19-27), High > 27): Readmission Risk Score: 6.3    Current PCP: No primary care provider on file.  PCP verified by CM? No (put  clinic info on B[facility]ANA)    Chart Reviewed: Yes      History Provided by: Patient, Child/Family (Mother)  Patient Orientation: Alert and Oriented, Person, Place, Situation    Patient Cognition: Alert    Hospitalization in the last 30 days (Readmission):  No    If yes, Readmission Assessment in CM Navigator will be completed.    Advance Directives:      Code Status: Full Code   Patient's Primary Decision Maker is: Legal Next of Kin      Discharge Planning:    Patient lives with: Family Members Type of Home: House  Primary Care Giver: Self  Patient Support Systems include: Family Members, Parent   Current Financial resources: Medicaid, Medicare  Current community resources:    Current services prior to admission: None            Current DME:              Type of Home Care services:  None    ADLS  Prior functional level: Independent in ADLs/IADLs  Current functional level: Independent in ADLs/IADLs, Assistance with the following:, Mobility (lumbar sxs)    PT AM-PAC:   /24  OT AM-PAC:   /24    Family can provide assistance at DC: Yes  Would you like Case Management to discuss the discharge plan with any other family members/significant others, and if so, who? Yes (mother)  Plans to Return to Present Housing: Yes  Other Identified Issues/Barriers to RETURNING to current housing:  na  Potential Assistance needed at discharge: Other (Comment) (PCP- will include  Clinic on BRIANA)            Potential DME:    Patient expects to discharge to: House  Plan for transportation at discharge: Family    Financial    Payor: [employer] BET[employer] [facility] / Plan: [employer] MEDICAID [place] / Product Type: *No Product type* /     Does insurance require precert for SNF: Yes    Potential assistance Purchasing Medications: No  Meds-to-Beds request:        [id number] - [place], [place] - [address] - P [phone] - F [phone]  [address]  [facility] [address]  Phone: [phone] Fax: [phone]      Notes:    Factors facilitating achievement of predicted outcomes: Family support, Friend support, Motivated, Cooperative, and Pleasant    Barriers to discharge: Medical complications and post surgery complications    Additional Case Management Notes: Patient from home with parents. Patient will need a note for work. Patient does not drive. He uses a UBER for transport. Patient intents to return home with family and will have outpatient OT/PT after he arrives home with follow up with neurosurgeon after surgery..     The Plan for Transition of Care is related to the following treatment goals of Low back pain [M54.50]  Lumbar disc herniation [M51.26]    IF APPLICABLE: The Patient and/or patient representative Cori and his family were provided with a choice of provider and agrees with the discharge plan. Freedom of choice list with basic dialogue that supports the patient's individualized plan of care/goals and shares the quality data associated with the providers was provided to: Patient   Patient Representative Name:       The Patient and/or Patient Representative Agree with the Discharge Plan? Yes    Lea Rizvi RN  Case Management Department  Ph: [phone]

## 2025-02-27 NOTE — DISCHARGE INSTR - COC
ADLs:531208729}  Med Delivery   { [facility] MED Delivery:951703787}    Wound Care Documentation and Therapy:        Elimination:  Continence:   Bowel: {YES / NO:}  Bladder: {YES / NO:}  Urinary Catheter: {Urinary Catheter:381013258}   Colostomy/Ileostomy/Ileal Conduit: {YES / NO:}       Date of Last BM: ***    Intake/Output Summary (Last 24 hours) at 2025 1211  Last data filed at 2025 0851  Gross per 24 hour   Intake 0 ml   Output --   Net 0 ml     I/O last 3 completed shifts:  In: -   Out: 500 [Urine:500]    Safety Concerns:     { [facility] Safety Concerns:040684105}    Impairments/Disabilities:      {[facility] Impairments/Disabilities:952472160}    Nutrition Therapy:  Current Nutrition Therapy:   {[facility] Diet List:608371096}    Routes of Feeding: {[facility] DME Other Feedings:924130112}  Liquids: {Slp liquid thickness:39070}  Daily Fluid Restriction: {[facility] DME Yes amt example:019170034}  Last Modified Barium Swallow with Video (Video Swallowing Test): {Done Not Done Date:}    Treatments at the Time of Hospital Discharge:   Respiratory Treatments: ***  Oxygen Therapy:  {Therapy; copd oxygen:49332}  Ventilator:    {[facility] Vent List:788923785}    Rehab Therapies: {THERAPEUTIC INTERVENTION:8161203423}  Weight Bearing Status/Restrictions: {[facility] Weight Bearin}  Other Medical Equipment (for information only, NOT a DME order):  {EQUIPMENT:269375425}  Other Treatments: ***    Patient's personal belongings (please select all that are sent with patient):  {[facility] DME Belongings:538300889}    RN SIGNATURE:  {Esignature:181850312}    CASE MANAGEMENT/SOCIAL WORK SECTION    Inpatient Status Date: ***    Readmission Risk Assessment Score:  [facility] RISK OF UNPLANNED READMISSION 2.0             6.3 Total Score        Discharging to Facility/ Agency     [facility] for follow up after discharge: for primary care needs    [facility]  [address] [address]      [phone] - call and make follow up appointment  (on [facility] between [place] and [facility]/nearest bus route: [address], [facility])     For appointment: Please arrive 15 minutes prior to your appointment. Bring photo ID and insurance card if you have insurance.           / signature: Electronically signed by Lea Rizvi RN on 2/27/25 at 12:12 PM EST    PHYSICIAN SECTION    Prognosis: {Prognosis:7444598997}    Condition at Discharge: { Patient Condition:087946704}    Rehab Potential (if transferring to Rehab): {Prognosis:4738732559}    Recommended Labs or Other Treatments After Discharge: follow up with Tonie in 2 weeks, wash inciison daily with soap and water daily    Physician Certification: I certify the above information and transfer of Cori Rojas  is necessary for the continuing treatment of the diagnosis listed and that he requires {Admit to Appropriate Level of Care:01385} for {GREATER/LESS:799576373} 30 days.     Update Admission H&P: {[facility] DME Changes in HandP:430229283}    PHYSICIAN SIGNATURE:  {Esignature:143704281}

## 2025-02-27 NOTE — ANESTHESIA PRE PROCEDURE
Department of Anesthesiology  Preprocedure Note       Name:  Cori Rojas   Age:  19 y.o.  :  2005                                          MRN:  [id number]         Date:  2025      Surgeon: Surgeon(s):  Abdifatah Caban MD    Procedure: Procedure(s):  LUMBAR 5 - SACRAL 1 CENTRAL LAMINECTOMY AND DISCECTOMY    Medications prior to admission:   Prior to Admission medications    Medication Sig Start Date End Date Taking? Authorizing Provider   ketorolac (TORADOL) 10 MG tablet Take 1 tablet by mouth every 6 hours as needed for Pain 25   Merlin Michel MD   lidocaine 4 % external patch Place 1 patch onto the skin daily 2/22/25 3/24/25  Merlin Michel MD   cyclobenzaprine (FLEXERIL) 10 MG tablet Take 1 tablet by mouth 3 times daily as needed for Muscle spasms 2/22/25 3/4/25  Merlin Michel MD       Current medications:    Current Facility-Administered Medications   Medication Dose Route Frequency Provider Last Rate Last Admin   • 0.9 % sodium chloride infusion   IntraVENous Continuous Abdifatah Caban MD   Stopped at 25 1506   • sodium chloride flush 0.9 % injection 5-40 mL  5-40 mL IntraVENous 2 times per day Amina Uriarte, APRN - CNP       • sodium chloride flush 0.9 % injection 5-40 mL  5-40 mL IntraVENous PRN Amina Uriarte, APRN - CNP       • 0.9 % sodium chloride infusion   IntraVENous PRN Kalani, Amina, APRN - CNP       • potassium chloride (KLOR-CON M) extended release tablet 40 mEq  40 mEq Oral PRN Amina Uriarte, APRN - CNP        Or   • potassium bicarb-citric acid (EFFER-K) effervescent tablet 40 mEq  40 mEq Oral PRN Kalani, Amina, APRN - CNP        Or   • potassium chloride 10 mEq/100 mL IVPB (Peripheral Line)  10 mEq IntraVENous PRN Amina Uriarte, APRN - CNP       • magnesium sulfate 2000 mg in 50 mL IVPB premix  2,000 mg IntraVENous PRN Kalani, Amina, APRN - CNP       • [Held by provider] enoxaparin (LOVENOX) injection 40 mg  40 mg SubCUTAneous Daily Amina Uriarte

## 2025-02-27 NOTE — PROGRESS NOTES
4 Eyes Skin Assessment     NAME:  Cori Rojas  YOB: 2005  MEDICAL RECORD NUMBER:  [id number]    The patient is being assessed for  Admission    I agree that at least one RN has performed a thorough Head to Toe Skin Assessment on the patient. ALL assessment sites listed below have been assessed.      Areas assessed by both nurses:    Head, Face, Ears, Shoulders, Back, Chest, Arms, Elbows, Hands, Sacrum. Buttock, Coccyx, Ischium, Legs. Feet and Heels, and Under Medical Devices         Does the Patient have a Wound? No noted wound(s)       Heraclio Prevention initiated by RN: No  Wound Care Orders initiated by RN: No    Pressure Injury (Stage 3,4, Unstageable, DTI, NWPT, and Complex wounds) if present, place Wound referral order by RN under : No    New Ostomies, if present place, Ostomy referral order under : No     Nurse 1 eSignature: Electronically signed by Kimberly Alvarez RN on 2/26/25 at 7:10 PM EST    **SHARE this note so that the co-signing nurse can place an eSignature**    Nurse 2 eSignature: Electronically signed by Bria Aldridge RN on 2/26/25 at 7:42 PM EST

## 2025-02-27 NOTE — BRIEF OP NOTE
Brief Postoperative Note      Patient: Cori Rojas  YOB: 2005  MRN: [id number]    Date of Procedure: 2/27/2025    Pre-Op Diagnosis Codes:      * Cauda equina compression (HCC) [G83.4]    Post-Op Diagnosis: Same       Procedure(s):  LUMBAR 5 - SACRAL 1 CENTRAL LAMINECTOMY AND DISCECTOMY    Surgeon(s):  Abdifatah Caban MD    Assistant:  Surgical Assistant: Pia Lynne    Anesthesia: General    Estimated Blood Loss (mL): less than 100     Complications: None    Specimens:   * No specimens in log *    Implants:  * No implants in log *      Drains: * No LDAs found *    Findings:  Infection Present At Time Of Surgery (PATOS) (choose all levels that have infection present):  No infection present  Other Findings: Large disc herniation at L5-S1    Electronically signed by Abdifatah Caban MD on 2/27/2025 at 6:17 PM

## 2025-02-27 NOTE — OP NOTE
Operative Report    PATIENT NAME: Cori Rojas  YOB: 2005  MEDICAL RECORD# [id number]  SURGERY DATE: 02/27/2025    SURGEON: Abdifatah Caban MD, PhD    ASSISTANT:     PREOPERATIVE DIAGNOSIS:  1. Herniated lumbar disc at L5-S1 with severe central stenosis    POSTOPERATIVE DIAGNOSIS:  1. Herniated lumbar disc at L5-S1with severe central stenosis    PROCEDURES PERFORMED:  1.Central decompressive L5 laminectomy  2. Bilateral L5-S1 microdiscectomy and foraminotomy  3. Microdissection using the operating room microscope.  4. Intraoperative fluoroscopy    ANESTHESIA: General    ESTIMATED BLOOD LOSS: 25 cc    INDICATIONS FOR SURGERY: Mr Rojas is a 19 year old man who presented with severe back and and bilateral leg pain with progressive numbness involving the perineal region. MRI demonstrated a very large central L5-S1 disc extrusion with severe central stenosis. Given his severe symptoms and the extent of stenosis, the patient elected to proceed the surgical option of decompression and microdiscectomy at L5-S1. The patient understands the risks and benefits of the procedure including but not limited to bleeding, infection, worsened neurologic outcome, cerebral spinal fluid leak, need for additional procedures, anesthesia risks, and death.      DETAILS OF PROCEDURE:     Under universal protocol and informed consent, the patient was brought to the operating room. General anesthesia was induced and the patient was intubated. The patient was then positioned prone onto gel rolls. All pressure points were appropriately padded. A linear incision spanning the L5-S1 level was localized with fluoroscopy and marked. Intravenous antibiotic (Ancef) and Decadron were given by anesthesia. A time out was completed and the surgical site was prepped and draped in the usual sterile fashion.    The incision was made with a #15 scalpel at the midline. Bovie electrocautery was then used to complete the dissection down to the fascia.  Vicryl sutures in the subcutaneous tissues, and a subcuticular running 4-0 Monocryl suture for the skin. The wound was dressed with Dermabond topical skin adhesive.     All needle, sponge, and instrument counts were correct. The patient was turned back to supine position onto a hospital bed and extubated without difficulty. He was taken to the PACU in stable condition. I affirm in accordance with CMS regulations that I was present and scrubbed for all critical portions of the case.     COMPLICATIONS: No complications apparent.    DRAINS: None.    DISPOSITION: The patient was transferred to the PACU in stable condition

## 2025-02-27 NOTE — PROGRESS NOTES
NEUROSURGERY POST-OP PROGRESS NOTE    Patient Name: Cori Rojas YOB: 2005   Sex: Male Age: 19 yrs     Medical Record Number: [id number] Acct Number: [id number]   Room Number: 3314/3314-01 Hospital Day: Hospital Day: 2     Interval History:      Subjective:  After an extensive discussion regarding treatment options Dr. Caban has recommended surgical intervention. The planned surgical procedure includes: L5-s1 discectomy and laminectomy.   In language easily understood by a layperson, the risks and benefits of surgical intervention were discussed at length with the patient and any family members present. Risks including, but not limited to, infection, bleeding, possible blood transfusion, numbness, weakness, paralysis, loss of bowel or bladder control, or death were explained fully.  Any questions were answered to the patient's satisfaction and signed consent obtained.     Objective:    VITAL SIGNS   /70   Pulse 60   Temp 98.5 °F (36.9 °C) (Oral)   Resp 18   Ht 1.88 m (6' 2\")   Wt 86.7 kg (191 lb 2.2 oz)   SpO2 98%   BMI 24.54 kg/m²    Height Height: 188 cm (6' 2\")   Weight Weight - Scale: 86.7 kg (191 lb 2.2 oz)        Allergies Allergies   Allergen Reactions    Grass Pollen(K-O-R-T-Swt Neel)     Shrimp Extract Hives      Diet Diet NPO   Isolation Contact     LABS   Basic Metabolic Profile Recent Labs     02/26/25  0124 02/26/25  1658    137   K 3.5 4.0    101   CO2 27 24   BUN 9 10   CREATININE 0.8* 0.8*   GLUCOSE 100* 134*   CALCIUM 9.5 9.8   MG 2.31  --       Complete Blood Count Recent Labs     02/26/25  0124 02/26/25  1658   WBC 6.3 6.9   RBC 4.93 5.30   HGB 15.1 16.2   HCT 44.4 48.1    303      Coagulation Studies Recent Labs     02/26/25  1658   PROTIME 13.9   INR 1.05        MEDICATIONS   Inpatient  Medications     sodium chloride flush, 5-40 mL, IntraVENous, 2 times per day    [Held by provider] enoxaparin, 40 mg, SubCUTAneous, Daily   Infusions    sodium chloride      sodium chloride      lactated ringers        Antibiotics   Recent Abx Admin        No antibiotic orders with administrations found.                     Neurologic Exam:  Mental status: awake and alert and oriented x4        Musculoskeletal:   Gait: Not tested   Tone: normal  Sensory:  numbness from waist line into buttocks, penis and back of legs. Numbness stops at knees   Motor strength:    Right  Left    Right  Left    Deltoid  5 5  Hip Flex  5 5   Biceps  5 5  Knee Extensors  5 5   Triceps  5 5  Knee Flexors  5 5   Wrist Ext  5 5  Ankle Dorsiflex.  5 5   Wrist Flex  5 5  Ankle Plantarflex.  5 5   Handgrip  5 5  Ext Rickey Longus  5 5   Thumb Ext  5 5         Respiratory:  Unlabored respiratory pattern    Abdomen:   Soft, ND       Cardiovascular:  Warm, well perfused    Assessment   20 yo male admitted w/ back pain, radiculopathy and saddle anesthesia, started after playing basketball and lifting weights last week. MRI reveals a large acute/subacute disc herniation at L5-S1 resulting in severe central  canal stenosis    Plan:  To OR this afternoon  NPO  IVF started  Hibiclens shower  Patient was seen with Dr. Caban who agrees with above assessment and plan.     Electronically signed by: ASHUTOSH Garcia - CNP, 2/27/2025 9:28 AM   Neurosurgery Nurse Practitioner  [phone]

## 2025-02-27 NOTE — H&P
V2.0  History and Physical      Name:  Cori Rojas /Age/Sex: 2005  (19 y.o. male)   MRN & CSN:  [id number] & [id number] Encounter Date/Time: 2025 8:49 AM EST   Location:  [address] [place] [address] PCP: No primary care provider on file.       Hospital Day: 2    Assessment and Plan:   Cori Rojas is a 19 y.o. male with a pmh of asthma who presents with Low back pain; patient initially presented on [facility]25 to [facility] with chief complaint of back pain and radiculopathy; patient was reportedly playing basketball 3 days prior and felt like \"pinched nerve\".  Patient reported pain started while he sat down.  It got progressively worsened and started radiating to the right leg worse with movement.  He also reporting numbness and tingling down left lower extremity up to his knee joint.  Denies any bladder or bowel incontinence denies any fever or chills        Hospital Problems             Last Modified POA    * (Principal) Low back pain 2025 Yes       Plan:  Lumbar disc herniation with severe central canal stenosis; neurosurgery consulted patient transferred from [facility]; repeat MRI lumbar spine without contrast per neurosurgery secondary to poor quality of previous showing L5/S1 large broad-based central disc extrusion with complete thecal sac effacement and severe spinal canal stenosis; NPO; OR later today; pain management; Decadron 10 mg IV times; muscle relaxer; multimodal pain medication, anticoagulation to start postsurgery; monitor PVR  Asthma-history of asthma no exacerbation noted patient does not take any medications at home    Disposition:   Current Living situation: Home  Expected Disposition: Home  Estimated D/C: TBD    Diet Diet NPO   DVT Prophylaxis [x] Lovenox, []  Heparin, [] SCDs, [] Ambulation,  [] Eliquis, [] Xarelto, [] Coumadin   Code Status Full Code   Surrogate Decision Maker/ POA Mother     Personally reviewed Lab Studies and Imaging     Discussed management of the  case with attending     EKG interpreted personally and results not indicated    Imaging that was interpreted personally includes MRI lumbar spine and results as described above    Drugs that require monitoring for toxicity include IV pain medication and the method of monitoring was vital        History from:     patient, electronic medical record    History of Present Illness:     Chief Complaint: Back pain  Cori Rojas is a 19 y.o. male with pmh of childhood asthma who presents with back pain     Review of Systems:        Pertinent positives and negatives discussed in HPI     Objective:     Intake/Output Summary (Last 24 hours) at 2/27/2025 0849  Last data filed at 2/26/2025 1152  Gross per 24 hour   Intake --   Output 500 ml   Net -500 ml      Vitals:   Vitals:    02/26/25 1310 02/26/25 1755 02/26/25 1930 02/27/25 0330   BP: (!) 128/57 129/75 126/77 124/70   Pulse: 58 74 62 60   Resp: 18 16 18 18   Temp: 98.6 °F (37 °C) 98.1 °F (36.7 °C) 97.4 °F (36.3 °C) 98.5 °F (36.9 °C)   TempSrc: Oral Axillary Oral Oral   SpO2: 98% 99% 99% 98%   Weight:    86.7 kg (191 lb 2.2 oz)   Height:           Personally Reviewed Medications Prior to Admission     Prior to Admission medications    Medication Sig Start Date End Date Taking? Authorizing Provider   ketorolac (TORADOL) 10 MG tablet Take 1 tablet by mouth every 6 hours as needed for Pain 2/22/25   Merlin Michel MD   lidocaine 4 % external patch Place 1 patch onto the skin daily 2/22/25 3/24/25  Merlin Michel MD   cyclobenzaprine (FLEXERIL) 10 MG tablet Take 1 tablet by mouth 3 times daily as needed for Muscle spasms 2/22/25 3/4/25  Merlin Michel MD       Physical Exam:    Physical Exam     General: NAD  Eyes: EOMI  ENT: neck supple  Cardiovascular: Regular rate.  Respiratory: Clear to auscultation  Gastrointestinal: Soft, non tender  Genitourinary: no suprapubic tenderness  Musculoskeletal: reproducible midline spine tenderness with radiation to left SI joint

## 2025-02-27 NOTE — PLAN OF CARE
Problem: Discharge Planning  Goal: Discharge to home or other facility with appropriate resources  Outcome: Progressing  Flowsheets (Taken 2/26/2025 1805 by Kimberly Alvarez, RN)  Discharge to home or other facility with appropriate resources: Identify barriers to discharge with patient and caregiver     Problem: Pain  Goal: Verbalizes/displays adequate comfort level or baseline comfort level  Outcome: Progressing

## 2025-02-27 NOTE — PROGRESS NOTES
Pt instructed to take shower with Hibiclens prior to surgery. Supplies provided.     Pt completed Hibiclens bath.

## 2025-02-28 VITALS
DIASTOLIC BLOOD PRESSURE: 64 MMHG | HEART RATE: 68 BPM | HEIGHT: 74 IN | TEMPERATURE: 98.1 F | OXYGEN SATURATION: 95 % | WEIGHT: 191.14 LBS | BODY MASS INDEX: 24.53 KG/M2 | RESPIRATION RATE: 16 BRPM | SYSTOLIC BLOOD PRESSURE: 129 MMHG

## 2025-02-28 PROBLEM — Z98.890 S/P DISCECTOMY FOR HERNIATED NUCLEUS PULPOSUS: Status: ACTIVE | Noted: 2025-02-28

## 2025-02-28 PROBLEM — Z87.39 S/P DISCECTOMY FOR HERNIATED NUCLEUS PULPOSUS: Status: ACTIVE | Noted: 2025-02-28

## 2025-02-28 LAB
ANION GAP SERPL CALCULATED.3IONS-SCNC: 10 MMOL/L (ref 3–16)
BUN SERPL-MCNC: 11 MG/DL (ref 7–20)
CALCIUM SERPL-MCNC: 9.3 MG/DL (ref 8.3–10.6)
CHLORIDE SERPL-SCNC: 101 MMOL/L (ref 99–110)
CO2 SERPL-SCNC: 24 MMOL/L (ref 21–32)
CREAT SERPL-MCNC: 0.8 MG/DL (ref 0.9–1.3)
GFR SERPLBLD CREATININE-BSD FMLA CKD-EPI: >90 ML/MIN/{1.73_M2}
GLUCOSE SERPL-MCNC: 122 MG/DL (ref 70–99)
POTASSIUM SERPL-SCNC: 4.3 MMOL/L (ref 3.5–5.1)
SODIUM SERPL-SCNC: 135 MMOL/L (ref 136–145)

## 2025-02-28 PROCEDURE — 97535 SELF CARE MNGMENT TRAINING: CPT

## 2025-02-28 PROCEDURE — 6360000002 HC RX W HCPCS: Performed by: NEUROLOGICAL SURGERY

## 2025-02-28 PROCEDURE — 2500000003 HC RX 250 WO HCPCS: Performed by: NEUROLOGICAL SURGERY

## 2025-02-28 PROCEDURE — 99024 POSTOP FOLLOW-UP VISIT: CPT | Performed by: NURSE PRACTITIONER

## 2025-02-28 PROCEDURE — 36415 COLL VENOUS BLD VENIPUNCTURE: CPT

## 2025-02-28 PROCEDURE — 6370000000 HC RX 637 (ALT 250 FOR IP): Performed by: NEUROLOGICAL SURGERY

## 2025-02-28 PROCEDURE — 97166 OT EVAL MOD COMPLEX 45 MIN: CPT

## 2025-02-28 PROCEDURE — 97161 PT EVAL LOW COMPLEX 20 MIN: CPT

## 2025-02-28 PROCEDURE — 1200000000 HC SEMI PRIVATE

## 2025-02-28 PROCEDURE — 80048 BASIC METABOLIC PNL TOTAL CA: CPT

## 2025-02-28 PROCEDURE — 97116 GAIT TRAINING THERAPY: CPT

## 2025-02-28 PROCEDURE — 97530 THERAPEUTIC ACTIVITIES: CPT

## 2025-02-28 PROCEDURE — APPNB180 APP NON BILLABLE TIME > 60 MINS: Performed by: NURSE PRACTITIONER

## 2025-02-28 RX ORDER — SENNA AND DOCUSATE SODIUM 50; 8.6 MG/1; MG/1
1 TABLET, FILM COATED ORAL 2 TIMES DAILY
COMMUNITY
Start: 2025-02-28

## 2025-02-28 RX ORDER — OXYCODONE HYDROCHLORIDE 5 MG/1
5 TABLET ORAL EVERY 6 HOURS PRN
Qty: 15 TABLET | Refills: 0 | Status: SHIPPED | OUTPATIENT
Start: 2025-02-28 | End: 2025-03-07

## 2025-02-28 RX ORDER — DEXAMETHASONE 4 MG/1
TABLET ORAL
Qty: 20 TABLET | Refills: 0 | Status: SHIPPED | OUTPATIENT
Start: 2025-02-28 | End: 2025-03-07

## 2025-02-28 RX ORDER — POLYETHYLENE GLYCOL 3350 17 G/17G
17 POWDER, FOR SOLUTION ORAL DAILY
COMMUNITY
Start: 2025-03-01 | End: 2025-03-31

## 2025-02-28 RX ORDER — METHOCARBAMOL 750 MG/1
750 TABLET, FILM COATED ORAL EVERY 6 HOURS PRN
Qty: 21 TABLET | Refills: 0 | Status: SHIPPED | OUTPATIENT
Start: 2025-02-28 | End: 2025-03-10

## 2025-02-28 RX ADMIN — OXYCODONE HYDROCHLORIDE 10 MG: 5 TABLET ORAL at 23:07

## 2025-02-28 RX ADMIN — DEXAMETHASONE 4 MG: 4 TABLET ORAL at 00:20

## 2025-02-28 RX ADMIN — POLYETHYLENE GLYCOL 3350 17 G: 17 POWDER, FOR SOLUTION ORAL at 07:46

## 2025-02-28 RX ADMIN — SODIUM CHLORIDE, PRESERVATIVE FREE 5 ML: 5 INJECTION INTRAVENOUS at 07:51

## 2025-02-28 RX ADMIN — DEXAMETHASONE 4 MG: 4 TABLET ORAL at 19:34

## 2025-02-28 RX ADMIN — HYDROMORPHONE HYDROCHLORIDE 1 MG: 1 INJECTION, SOLUTION INTRAMUSCULAR; INTRAVENOUS; SUBCUTANEOUS at 20:47

## 2025-02-28 RX ADMIN — METHOCARBAMOL 750 MG: 750 TABLET ORAL at 20:59

## 2025-02-28 RX ADMIN — HEPARIN SODIUM 5000 UNITS: 5000 INJECTION INTRAVENOUS; SUBCUTANEOUS at 20:47

## 2025-02-28 RX ADMIN — DIAZEPAM 5 MG: 5 TABLET ORAL at 13:33

## 2025-02-28 RX ADMIN — OXYCODONE HYDROCHLORIDE 10 MG: 5 TABLET ORAL at 07:46

## 2025-02-28 RX ADMIN — SENNOSIDES AND DOCUSATE SODIUM 1 TABLET: 50; 8.6 TABLET ORAL at 07:48

## 2025-02-28 RX ADMIN — SENNOSIDES AND DOCUSATE SODIUM 1 TABLET: 50; 8.6 TABLET ORAL at 20:47

## 2025-02-28 RX ADMIN — DEXAMETHASONE 4 MG: 4 TABLET ORAL at 06:03

## 2025-02-28 RX ADMIN — DEXAMETHASONE 4 MG: 4 TABLET ORAL at 23:07

## 2025-02-28 RX ADMIN — OXYCODONE HYDROCHLORIDE 10 MG: 5 TABLET ORAL at 17:05

## 2025-02-28 RX ADMIN — WATER 2000 MG: 1 INJECTION INTRAMUSCULAR; INTRAVENOUS; SUBCUTANEOUS at 07:48

## 2025-02-28 RX ADMIN — METHOCARBAMOL 1000 MG: 100 INJECTION INTRAMUSCULAR; INTRAVENOUS at 04:32

## 2025-02-28 RX ADMIN — WATER 2000 MG: 1 INJECTION INTRAMUSCULAR; INTRAVENOUS; SUBCUTANEOUS at 00:15

## 2025-02-28 RX ADMIN — SODIUM CHLORIDE, PRESERVATIVE FREE 10 ML: 5 INJECTION INTRAVENOUS at 20:48

## 2025-02-28 RX ADMIN — DEXAMETHASONE 4 MG: 4 TABLET ORAL at 12:22

## 2025-02-28 ASSESSMENT — PAIN DESCRIPTION - DESCRIPTORS
DESCRIPTORS: ACHING
DESCRIPTORS: ACHING;DISCOMFORT
DESCRIPTORS: ACHING
DESCRIPTORS: ACHING;DISCOMFORT

## 2025-02-28 ASSESSMENT — PAIN DESCRIPTION - ORIENTATION
ORIENTATION: MID
ORIENTATION: MID;LOWER
ORIENTATION: MID
ORIENTATION: MID
ORIENTATION: LOWER
ORIENTATION: MID

## 2025-02-28 ASSESSMENT — PAIN SCALES - GENERAL
PAINLEVEL_OUTOF10: 8
PAINLEVEL_OUTOF10: 6
PAINLEVEL_OUTOF10: 4
PAINLEVEL_OUTOF10: 8
PAINLEVEL_OUTOF10: 7
PAINLEVEL_OUTOF10: 7
PAINLEVEL_OUTOF10: 4

## 2025-02-28 ASSESSMENT — PAIN - FUNCTIONAL ASSESSMENT
PAIN_FUNCTIONAL_ASSESSMENT: ACTIVITIES ARE NOT PREVENTED
PAIN_FUNCTIONAL_ASSESSMENT: PREVENTS OR INTERFERES SOME ACTIVE ACTIVITIES AND ADLS

## 2025-02-28 ASSESSMENT — PAIN DESCRIPTION - FREQUENCY
FREQUENCY: CONTINUOUS
FREQUENCY: INTERMITTENT
FREQUENCY: INTERMITTENT

## 2025-02-28 ASSESSMENT — PAIN DESCRIPTION - ONSET
ONSET: ON-GOING
ONSET: ON-GOING

## 2025-02-28 ASSESSMENT — PAIN DESCRIPTION - LOCATION
LOCATION: BACK

## 2025-02-28 ASSESSMENT — PAIN DESCRIPTION - PAIN TYPE
TYPE: SURGICAL PAIN

## 2025-02-28 ASSESSMENT — PAIN DESCRIPTION - DIRECTION: RADIATING_TOWARDS: NO

## 2025-02-28 NOTE — PROGRESS NOTES
NEUROSURGERY POST-OP PROGRESS NOTE    Patient Name: Cori Rojas YOB: 2005   Sex: Male Age: 19 yrs     Medical Record Number: [id number] Acct Number: [id number]   Room Number: 5524/5524-01 Hospital Day: Hospital Day: 3     Interval History:  Post-operative Day#1 s/p Procedure(s) (LRB):  LUMBAR 5 - SACRAL 1 CENTRAL LAMINECTOMY AND DISCECTOMY (N/A)    Subjective: Pt pain is gone ans numbness is getting better in groin area and penis area    Objective:    VITAL SIGNS   /72   Pulse 58   Temp 97.6 °F (36.4 °C) (Oral)   Resp 18   Ht 1.88 m (6' 2\")   Wt 86.7 kg (191 lb 2.2 oz)   SpO2 98%   BMI 24.54 kg/m²    Height Height: 188 cm (6' 2\")   Weight Weight - Scale: 86.7 kg (191 lb 2.2 oz)        Allergies Allergies   Allergen Reactions    Grass Pollen(K-O-R-T-Swt Neel)     Shrimp Extract Hives      Diet ADULT DIET; Regular   Isolation Contact     LABS   Basic Metabolic Profile Recent Labs     02/26/25  0124 02/26/25  1658 02/28/25  0559    137 135*   K 3.5 4.0 4.3    101 101   CO2 27 24 24   BUN 9 10 11   CREATININE 0.8* 0.8* 0.8*   GLUCOSE 100* 134* 122*   CALCIUM 9.5 9.8 9.3   MG 2.31  --   --       Complete Blood Count Recent Labs     02/26/25  0124 02/26/25  1658   WBC 6.3 6.9   RBC 4.93 5.30   HGB 15.1 16.2   HCT 44.4 48.1    303      Coagulation Studies Recent Labs     02/26/25  1658   PROTIME 13.9   INR 1.05        MEDICATIONS   Inpatient Medications     sodium chloride flush, 5-40 mL, IntraVENous, 2 times per day    polyethylene glycol, 17 g, Oral, Daily    sennosides-docusate sodium, 1 tablet, Oral, BID    methocarbamol (ROBAXIN) IVPB, 1,000 mg, IntraVENous, Q8H **FOLLOWED BY** methocarbamol, 750 mg, Oral, Q6H PRN    heparin (porcine), 5,000 Units, SubCUTAneous, 3 times per day    dexAMETHasone, 4 mg, Oral, 4      __________________________________________________________________    I saw and examined Cori Rojas on 02/28/25. I agree with the assessment and plan as detailed above.     Abdifatah Caban MD, PhD  [address], [address]  [place], [place], [address] [phone] (z), [phone] (o)

## 2025-02-28 NOTE — PLAN OF CARE
Problem: Discharge Planning  Goal: Discharge to home or other facility with appropriate resources  2/28/2025 0755 by Sydnie Aaron, RN  Outcome: Progressing   Patient has case management consulted for discharge.  Problem: Pain  Goal: Verbalizes/displays adequate comfort level or baseline comfort level  2/28/2025 0755 by Sydnie Aaron, RN  Outcome: Progressing  Flowsheets (Taken 2/28/2025 0423 by Jennifer Mathew, RN)  Verbalizes/displays adequate comfort level or baseline comfort level: Encourage patient to monitor pain and request assistance   Pain medications are being managed by the MAR.  Problem: Safety - Adult  Goal: Free from fall injury  2/28/2025 0755 by Sydnie Aaron, RN  Outcome: Progressing   Patient has all standard fall precautions in place.

## 2025-02-28 NOTE — PROGRESS NOTES
Mother called, she was in the waiting area, went out and updated that her son did well and was just moved to room 5524, and she will head up with the rest of the family.

## 2025-02-28 NOTE — PROGRESS NOTES
Occupational Therapy  Facility/Department: [facility] ORTHO/NEURO  Occupational Therapy Initial Assessment    Name: Cori Rojas  : 2005  MRN: [id number]  Date of Service: 2025    Discharge Recommendations:  24 hour supervision or assist  OT Equipment Recommendations  Equipment Needed: Yes  Mobility Devices: ADL Assistive Devices  ADL Assistive Devices: Shower Chair with back       Patient Diagnosis(es): The encounter diagnosis was S/P discectomy for herniated nucleus pulposus.  Past Medical History:  has a past medical history of Asthma and High risk sexual behavior.  Past Surgical History:  has a past surgical history that includes Lumbar spine surgery (N/A, 2025).    Treatment Diagnosis: Impaired ADLs, functional mobility/transfers      Assessment  Performance deficits / Impairments: Decreased functional mobility ;Decreased endurance;Decreased ADL status  Assessment: Pt presenting POD1 s/p L5-S1 CENTRAL LAMINECTOMY AND DISCECTOMY. PTA, pt resided from home w/ mother, reporting IND w/ ADLS/IADLs and functional mobility. Today, pt requiring increased A d/t spinal precautions, requiring, CGA for functional transfers and mobility. Pt requiring SBA for LB dressing, toileting, and sink-level ADLs. Pt educated on spinal precautions and activity modfications prior to return home. Encourage OOB activity w/ nursing staff A. Recommend return home w/ 24h A initially and recommend shower chair w/ back upon d/c home. Continue per POC.  Treatment Diagnosis: Impaired ADLs, functional mobility/transfers  Decision Making: Medium Complexity  REQUIRES OT FOLLOW-UP: Yes  Activity Tolerance  Activity Tolerance: Patient Tolerated treatment well     Plan  Occupational Therapy Plan  Times Per Week: 5-7  Current Treatment Recommendations: Functional mobility training, Endurance training, Safety education & training, Self-Care / ADL, Patient/Caregiver education & training, Home management training    Restrictions  Position

## 2025-02-28 NOTE — ANESTHESIA POSTPROCEDURE EVALUATION
Department of Anesthesiology  Postprocedure Note    Patient: Cori Rojas  MRN: [id number]  YOB: 2005  Date of evaluation: 2/27/2025    Procedure Summary       Date: 02/27/25 Room / Location: [address]    Anesthesia Start: 1608 Anesthesia Stop: 1847    Procedure: LUMBAR 5 - SACRAL 1 CENTRAL LAMINECTOMY AND DISCECTOMY (Back) Diagnosis:       Cauda equina compression (HCC)      (Cauda equina compression (HCC) [G83.4])    Surgeons: Abdifatah Caban MD Responsible Provider: Vineet Rodriguez DO    Anesthesia Type: general ASA Status: 2            Anesthesia Type: No value filed.    Barbi Phase I:      Barbi Phase II:      Vitals:    02/27/25 1850   BP: (!) 119/56   Pulse: 60   Resp: 16   Temp:    SpO2: 98%       Anesthesia Post Evaluation    Patient location during evaluation: PACU  Patient participation: complete - patient participated  Level of consciousness: awake and awake and alert  Pain score: 0  Airway patency: patent  Nausea & Vomiting: no nausea and no vomiting  Cardiovascular status: hemodynamically stable  Respiratory status: acceptable  Hydration status: euvolemic  Pain management: adequate and satisfactory to patient    No notable events documented.

## 2025-02-28 NOTE — PROGRESS NOTES
PACU Transfer Note    Vitals:    02/27/25 1930   BP: 117/77   Pulse: 80   Resp: 13   Temp: 97.6 °F (36.4 °C)   SpO2: 100%       In: 90 [I.V.:90]  Out: -     Pain assessment:    Pain Level: 0    Report given to Receiving unit JOSE Rodriguez.    Transported by Sabra, PACU transporter.    2/27/2025 7:45 PM

## 2025-02-28 NOTE — PROGRESS NOTES
Patient is alert and orient X4, VSS, patient is ambulating X1 gait belt walker stand by assist, patient is voiding adequately per BRP, patient is tolerating a reg diet, patient worked with therapy and was up to the chair some of the mooring and returned to bed this afternoon, patient has a discharge order, patient is waiting for a ride from his family, his mother does not get off work until 5:30p.m., patient has all standard fall precautions in place, call light and tray table are in reach.

## 2025-02-28 NOTE — PLAN OF CARE
Problem: Discharge Planning  Goal: Discharge to home or other facility with appropriate resources  Outcome: Progressing     Problem: Pain  Goal: Verbalizes/displays adequate comfort level or baseline comfort level  Outcome: Progressing  Flowsheets (Taken 2/27/2025 2004)  Verbalizes/displays adequate comfort level or baseline comfort level: Encourage patient to monitor pain and request assistance     Problem: Safety - Adult  Goal: Free from fall injury  Outcome: Progressing     Problem: Chronic Conditions and Co-morbidities  Goal: Patient's chronic conditions and co-morbidity symptoms are monitored and maintained or improved  Outcome: Progressing

## 2025-02-28 NOTE — DISCHARGE SUMMARY
stenosis. Bilateral subarticular zone narrowing. Moderate bilateral facet hypertrophy.     Multilevel lower lumbar spondylosis and facet arthropathy. 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 The dominant finding is at L5-S1 where there is a large broad-based central disc extrusion causing complete thecal sac effacement and severe spinal canal stenosis. This is similar to the study obtained 11 hours prior. Mild central canal stenosis at L3-L4 and L4-L5. Electronically signed by Epi Curiel MD    MRI LUMBAR SPINE WO CONTRAST    Result Date: 2/26/2025  EXAMINATION: MRI OF THE LUMBAR SPINE WITHOUT CONTRAST, 2/26/2025 2:04 am TECHNIQUE: Multiplanar multisequence MRI of the lumbar spine was performed without the administration of intravenous contrast. COMPARISON: None. HISTORY: ORDERING SYSTEM PROVIDED HISTORY: back injury/sensory deficits TECHNOLOGIST PROVIDED HISTORY: Reason for exam:->back injury/sensory deficits Decision Support Exception - unselect if not a suspected or confirmed emergency medical condition->Emergency Medical Condition (MA) FINDINGS: BONES/ALIGNMENT: There is reversal of the normal lumbar lordosis.  Vertebral body heights and marrow signal are normal. SPINAL CORD: The conus terminates normally. SOFT TISSUES: No paraspinal mass identified. L1-L2: There is no significant disc herniation, spinal canal stenosis or neural foraminal narrowing. L2-L3: There is no significant disc herniation, spinal canal stenosis or neural foraminal narrowing. L3-L4: There is no significant disc herniation, spinal canal stenosis or neural foraminal narrowing. L4-L5: There is no significant disc herniation, spinal canal stenosis or neural foraminal narrowing. L5-S1: Moderate disc space narrowing more pronounced anteriorly.  There is mild posterior disc bulge with superimposed large posterior central acute/subacute disc herniation with posterior protrusion resulting in severe central

## 2025-02-28 NOTE — PROGRESS NOTES
Physical Therapy  Facility/Department: [facility] ORTHO/NEURO  Physical Therapy Initial Assessment    Name: Cori Rojas  : 2005  MRN: [id number]  Date of Service: 2025    Discharge Recommendations:  24 hour supervision or assist   PT Equipment Recommendations  Equipment Needed: Yes  Mobility Devices: Walker  Walker: Rolling      Patient Diagnosis(es): Low back pain  Past Medical History:  has a past medical history of Asthma and High risk sexual behavior.  Past Surgical History:  has a past surgical history that includes Lumbar spine surgery (N/A, 2025).    Assessment  Body Structures, Functions, Activity Limitations Requiring Skilled Therapeutic Intervention: Decreased functional mobility ;Decreased balance;Decreased body mechanics  Assessment: Pt presents to inpt PT s/p LUMBAR 5 - SACRAL 1 CENTRAL LAMINECTOMY AND DISCECTOMY 25. Pt is currently functioning below baseline. Pt requires CGA with transfers, ambulation, and stair negotiation. Pt is guarded with transfers and ambulation. Pt requires continued education on spinal precautions and postural awareness. Anticipate that pt will return home upon discharge. Recommend initial 24 hr assistance and rolling walker. Will continue to follow while here to maximize independence.  Therapy Prognosis: Good  Decision Making: Low Complexity  Requires PT Follow-Up: Yes  Activity Tolerance  Activity Tolerance: Patient tolerated evaluation without incident;Patient tolerated treatment well    Plan  Physical Therapy Plan  General Plan: 5-7 times per week  Current Treatment Recommendations: Strengthening, Balance training, Functional mobility training, Transfer training, Gait training, Stair training, Therapeutic activities, Safety education & training, Equipment evaluation, education, & procurement  Safety Devices  Type of Devices: Call light within reach, Chair alarm in place, Left in chair, Nurse notified    Restrictions  Position Activity Restriction  Spinal  Pt navigated steps slowly and cautiously.     Balance  Sitting - Static:  (supervision)  Sitting - Dynamic:  (SBA. Pt threaded shorts onto LEs in sitting at EOB independently without breaking spinal precautions.)  Standing - Static:  (SBA)  Standing - Dynamic:  (CGA with RW. Pt stood at sink to wash hands.)  Exercise Treatment: Pt instructed on calf stretch standing on a step. Pt educated on stretching calf while seated with gait belt.                                                      AM-PAC - Mobility    AM-PAC Basic Mobility - Inpatient   How much help is needed turning from your back to your side while in a flat bed without using bedrails?: A Little  How much help is needed moving from lying on your back to sitting on the side of a flat bed without using bedrails?: A Little  How much help is needed moving to and from a bed to a chair?: A Little  How much help is needed standing up from a chair using your arms?: A Little  How much help is needed walking in hospital room?: A Little  How much help is needed climbing 3-5 steps with a railing?: A Little  [facility] Inpatient Mobility Raw Score : 18  AM-PAC Inpatient T-Scale Score : 43.63  Mobility Inpatient CMS 0-100% Score: 46.58  Mobility Inpatient CMS G-Code Modifier : CK            Goals  Short Term Goals  Time Frame for Short Term Goals: DIscharge  Short Term Goal 1: Pt will perform bed mobility using log roll method with supervision and no cuing.  Short Term Goal 2: Pt will perform sit to stand with supervision.  Short Term Goal 3: Pt will ambulate 400 ft with supervision.  Short Term Goal 4: Pt will negotiate a flight of stairs with unilateral hand rail use and supervision.  Patient Goals   Patient Goals : To return home       Education  Patient Education  Education Given To: Patient  Education Provided: Role of Therapy;Plan of Care;Precautions;Transfer Training;Mobility Training;Equipment  Education Provided Comments: Pt educated on spinal precautions, log roll

## 2025-02-28 NOTE — CONSULTS
NEUROSURGERY CONSULT  MARK ANTHONY MAI  [id number]   2005 2/27/2025    Requesting physician: Sriram Antony MD    Reason for consultation: disc herniation     History of present illness: Patient is a 19 y.o. male w/ PMH of asthma who presented on 2/27/2025 with back pain and radiculopathy. Pain started last Wednesday after he played basketball and lifted weights. At that time he had midline back pain only. On Friday he played basketball again and noticed worsening of his pain. The pain started radiating to his buttocks and down the back of both of his legs, with numbness in the same distrubution. He was seen in the ED on 2/22 and given muscle relaxants and toradol which have provided him with little relief. He currently complains of back/leg pain worse when lying flat, he has numbness to buttocks, penis and the back of his legs which stops at his knees. He denies urinary or fecal incontinence. He came back for evaluation overnight, an MRI revealed large L5-S1 disc herniation w/ severe canal stenosis. Neurosurgery consulted for recommendations.     ROS:   GENERAL:  Denies fever or recent illness. Denies weight changes   EYES:  Denies vision change or diplopia  EARS:  Denies hearing loss  CARDIAC:  Denies chest pain  RESPIRATORY:  Denies shortness of breath  SKIN:  Denies rash or lesions   HEM:  Denies excessive bruising  PSYCH:  Denies anxiety or depression  NEURO: +numbness, +saddle anesthesia   :  Denies urinary difficulty  GI: Denies nausea, vomiting, diarrhea or constipation  MUSCULOSKELETAL:  +back pain, + leg pain     Allergies   Allergen Reactions    Grass Pollen(K-O-R-T-Swt Neel)     Shrimp Extract Hives       Past Medical History:   Diagnosis Date    Asthma     High risk sexual behavior         History reviewed. No pertinent surgical history.    Social History     Occupational History    Not on file   Tobacco Use    Smoking status: Never    Smokeless tobacco: Never   Vaping Use    Vaping status: Never  SSI  -Flexion and extension X-rays  -I discussed the management options with Mr. Rojas at length including 1) conservative care measures or 2) surgery.  Given his young age, the size of his disc extrusion, and his early signs of cauda equina syndrome, he elected to proceed with a central laminectomy and microdiscectomy at L5-S1. The patient is aware of the potential benefits of the operation as well as the possible risks including (but not limited to): infection, bleeding, wound-healing problems, worsened neurologic outcome, cerebrospinal fluid leak, damage to surrounding structures, nerve root injury, weakness, numbness, failure to improve, iatrogenic instability need for additional procedures, stroke, coma, or even death.  The patient understands that untoward events during or after surgery could result in a permanent worsening of quality of life.  Our plan is to proceed with surgical intervention today.    Abdifatah Caban MD, PhD  [address], [address]  [place], [place], [address] [phone] (c), [phone] (o)

## 2025-02-28 NOTE — PROGRESS NOTES
From OR sedated, incision EMMETT with skin glue CDI, VSS.    Report from Dr. Rodriguez and OR RN.    S/P LUMBAR 5 - SACRAL 1 CENTRAL LAMINECTOMY AND DISCECTOMY

## 2025-02-28 NOTE — PROGRESS NOTES
4 Eyes Skin Assessment     NAME:  Cori Rojas  YOB: 2005  MEDICAL RECORD NUMBER:  [id number]    The patient is being assessed for  Admission    I agree that at least one RN has performed a thorough Head to Toe Skin Assessment on the patient. ALL assessment sites listed below have been assessed.      Areas assessed by both nurses:    Head, Face, Ears, Shoulders, Back, Chest, Arms, Elbows, Hands, Sacrum. Buttock, Coccyx, Ischium, Legs. Feet and Heels, and Under Medical Devices         Does the Patient have a Wound? Yes wound(s) were present on assessment. LDA wound assessment was Initiated and completed by RN  Surgical: LUMBAR 5 - SACRAL 1 CENTRAL LAMINECTOMY AND DISCECTOMY   Large disc herniation at L5-S1   Incision EMMETT with skin glue CDI        Heraclio Prevention initiated by RN: No  Wound Care Orders initiated by RN: No    Pressure Injury (Stage 3,4, Unstageable, DTI, NWPT, and Complex wounds) if present, place Wound referral order by RN under : No    New Ostomies, if present place, Ostomy referral order under : No     Nurse 1 eSignature: Electronically signed by Jennifer Mathew RN on 2/28/25 at 12:38 AM EST    **SHARE this note so that the co-signing nurse can place an eSignature**    Nurse 2 eSignature: {Esignature:[id number]}

## 2025-02-28 NOTE — CARE COORDINATION
Case Management Assessment            Discharge Note                    Date / Time of Note: 2/28/2025 1:59 PM                  Discharge Note Completed by: Doris Persaud RN    Patient Name: Cori Rojas   YOB: 2005  Diagnosis: Low back pain [M54.50]  Lumbar disc herniation [M51.26]   Date / Time: 2/26/2025  9:40 AM    Current PCP: No primary care provider on file.  Clinic patient: No    Hospitalization in the last 30 days: No       Advance Directives:  Code Status: Full Code  [place] DNR form completed and on chart: Not Indicated    Financial:  Payor: [place] / Plan: [place] / Product Type: *No Product type* /      Pharmacy:    [facility] [address] [facility] [address] [facility] - P [phone] - F [phone]  Formerly [facility] [facility] [address]  Phone: [phone] Fax: [phone]    [facility] #[id number] [address] - P [phone] - F [phone]  [facility] [address]  Phone: [phone] Fax: [phone]      Assistance purchasing medications?: Potential Assistance Purchasing Medications: No  Assistance provided by Case Management: None at this time    Does patient want to participate in local refill/ meds to beds program?:      Meds To Beds General Rules:  1. Can ONLY be done Monday- Friday between 8:30am-5pm  2. Prescription(s) must be in pharmacy by 3pm to be filled same day  3.Copy of patient's insurance/ prescription drug card and patient face sheet must be sent along with the prescription(s)  4. Cost of Rx cannot be added to hospital bill. If financial assistance is needed, please contact unit  or ;  or  CANNOT provide pharmacy voucher for patients co-pays  5. Patients can then  the prescription on their way out of the hospital at discharge, or pharmacy can deliver to the bedside if staff is available. (payment due at time of  pick-up or delivery - cash, check, or card accepted)     Able to afford home medications/ co-pay costs: Yes    ADLS:  Current PT AM-PAC Score: 18 /24  Current OT AM-PAC Score: 18 /24    DISCHARGE Disposition: Home- No Services Needed    LOC at discharge: Not Applicable  BRIANA Completed: Not Indicated    Notification completed in HENS/PAS?:  Not Applicable    IMM Completed:   Not Indicated due to: not medicare         Transportation:  Transportation PLAN for discharge: family   Mode of Transport: Private Car  Reason for medical transport: Not Applicable  Name of Transport Company: Not Applicable  Time of Transport: tbd    Transport form completed: Not Indicated    Home Care:  Home Care ordered at discharge: Not Indicated  Home Care Agency: Not Applicable  Orders faxed: No    Durable Medical Equipment:  DME Provider: [employer]  Equipment obtained during hospitalization: walker and bath bench        Additional CM Notes:   Patient is discharging home with parents. Patient will receive RW and Shower chair from [employer].  Family to transport home later today.    COVID Result:    Lab Results   Component Value Date/Time    COVID19 Not Detected 12/09/2021 12:32 PM       The Plan for Transition of Care is related to the following treatment goals of Low back pain [M54.50]  Lumbar disc herniation [M51.26]    The Patient and/or patient representative Cori and his family were provided with a choice of provider and agrees with the discharge plan Yes    Freedom of choice list was provided with basic dialogue that supports the patient's individualized plan of care/goals and shares the quality data associated with the providers. Yes    Care Transitions patient: No    Doris Persaud RN  [facility]  Case Management Department  Ph: [phone]  Fax: [phone]

## 2025-03-01 NOTE — PLAN OF CARE
Problem: Discharge Planning  Goal: Discharge to home or other facility with appropriate resources  2/28/2025 2214 by Lulu Mills RN  Outcome: Completed     Problem: Pain  Goal: Verbalizes/displays adequate comfort level or baseline comfort level  2/28/2025 2214 by Lulu Mills RN  Outcome: Completed  2/28/2025 2113 by Lulu Mills RN  Outcome: Progressing     Problem: ABCDS Injury Assessment  Goal: Absence of physical injury  2/28/2025 2214 by Lulu Mills RN  Outcome: Completed  2/28/2025 2113 by Lulu Mills RN  Outcome: Progressing     Problem: Safety - Adult  Goal: Free from fall injury  2/28/2025 2214 by Lulu Mills RN  Outcome: Completed  2/28/2025 2113 by Lulu Mills RN  Outcome: Progressing     Problem: Chronic Conditions and Co-morbidities  Goal: Patient's chronic conditions and co-morbidity symptoms are monitored and maintained or improved  Outcome: Completed

## 2025-03-01 NOTE — PLAN OF CARE
Problem: Discharge Planning  Goal: Discharge to home or other facility with appropriate resources  2/28/2025 2113 by Lulu Mills RN  Outcome: Progressing  Flowsheets  Taken 2/28/2025 2030 by Lulu Mills RN  Discharge to home or other facility with appropriate resources:   Identify barriers to discharge with patient and caregiver   Arrange for needed discharge resources and transportation as appropriate   Identify discharge learning needs (meds, wound care, etc)  Taken 2/28/2025 1600 by Sydnie Aaron RN  Discharge to home or other facility with appropriate resources: Identify barriers to discharge with patient and caregiver  Taken 2/28/2025 0800 by Sydnie Aaron RN  Discharge to home or other facility with appropriate resources: Identify barriers to discharge with patient and caregiver     Problem: Pain  Goal: Verbalizes/displays adequate comfort level or baseline comfort level  2/28/2025 2113 by Luul Mills RN  Outcome: Progressing     Problem: ABCDS Injury Assessment  Goal: Absence of physical injury  Outcome: Progressing     Problem: Safety - Adult  Goal: Free from fall injury  2/28/2025 2113 by Lulu Mills, RN  Outcome: Progressing

## 2025-03-05 ENCOUNTER — HOSPITAL ENCOUNTER (EMERGENCY)
Age: 20
Discharge: HOME | End: 2025-03-05
Payer: COMMERCIAL

## 2025-03-05 VITALS
OXYGEN SATURATION: 100 % | TEMPERATURE: 97.5 F | RESPIRATION RATE: 16 BRPM | SYSTOLIC BLOOD PRESSURE: 135 MMHG | DIASTOLIC BLOOD PRESSURE: 77 MMHG | HEART RATE: 94 BPM | HEIGHT: 74 IN | BODY MASS INDEX: 24.54 KG/M2

## 2025-03-05 DIAGNOSIS — Z51.89 VISIT FOR WOUND CHECK: Primary | ICD-10-CM

## 2025-03-05 PROCEDURE — 99282 EMERGENCY DEPT VISIT SF MDM: CPT

## 2025-03-05 NOTE — ED PROVIDER NOTES
THEN 1 tablet daily for 2 days. 20 tablet 0    polyethylene glycol (GLYCOLAX) 17 g packet Take 1 packet by mouth daily      sennosides-docusate sodium (SENOKOT-S) 8.6-50 MG tablet Take 1 tablet by mouth 2 times daily      methocarbamol (ROBAXIN) 750 MG tablet Take 1 tablet by mouth every 6 hours as needed (spasms) 21 tablet 0    lidocaine 4 % external patch Place 1 patch onto the skin daily 30 patch 0       ALLERGIES    Allergies   Allergen Reactions    Grass Pollen(K-O-R-T-Swt Neel)     Shrimp Extract Hives       SOCIAL & FAMILY HISTORY    Social History     Socioeconomic History    Marital status: Single     Spouse name: None    Number of children: None    Years of education: None    Highest education level: None   Tobacco Use    Smoking status: Never    Smokeless tobacco: Never   Vaping Use    Vaping status: Never Used   Substance and Sexual Activity    Alcohol use: Never    Drug use: Yes     Types: Marijuana (Weed)     Comment: daily    Sexual activity: Not Currently     Birth control/protection: None     Social Determinants of Health     Financial Resource Strain: Not on File (8/26/2019)    Received from [employer]    Financial Resource Strain     Financial Resource Strain: 0   Food Insecurity: No Food Insecurity (2/27/2025)    Hunger Vital Sign     Worried About Running Out of Food in the Last Year: Never true     Ran Out of Food in the Last Year: Never true   Transportation Needs: No Transportation Needs (2/27/2025)    PRAPARE - Transportation     Lack of Transportation (Medical): No     Lack of Transportation (Non-Medical): No   Physical Activity: Not on File (8/26/2019)    Received from [employer]    Physical Activity     Physical Activity: 0   Stress: Not on File (8/26/2019)    Received from [employer]    Stress     Stress: 0   Social Connections: Not on File (8/26/2019)    Received from [employer]    Social Connections     Social Connections and Isolation: 0   Intimate Partner Violence: Low Risk  (10/31/2016)    Received

## 2025-03-05 NOTE — ED NOTES
D/C: Order noted for d/c. Pt confirmed d/c paperwork  have correct name. Discharge and education instructions reviewed with patient. Teach-back successful.  Pt verbalized understanding. Pt denied questions at this time. No acute distress noted. Patient instructed to follow-up as noted - return to emergency department if symptoms worsen. Patient verbalized understanding. Discharged per EDMD with discharge instructions. Pt discharged  to private vehicle. Patient stable upon departure. Thanked patient for choosing [facility] for care.

## 2025-03-06 ENCOUNTER — HOSPITAL ENCOUNTER (EMERGENCY)
Age: 20
Discharge: HOME | End: 2025-03-06
Attending: EMERGENCY MEDICINE
Payer: COMMERCIAL

## 2025-03-06 VITALS
TEMPERATURE: 98.2 F | SYSTOLIC BLOOD PRESSURE: 143 MMHG | DIASTOLIC BLOOD PRESSURE: 80 MMHG | HEIGHT: 75 IN | WEIGHT: 202.16 LBS | RESPIRATION RATE: 16 BRPM | BODY MASS INDEX: 25.14 KG/M2 | OXYGEN SATURATION: 100 % | HEART RATE: 66 BPM

## 2025-03-06 DIAGNOSIS — Z48.89 ENCOUNTER FOR POST SURGICAL WOUND CHECK: Primary | ICD-10-CM

## 2025-03-06 PROCEDURE — 99282 EMERGENCY DEPT VISIT SF MDM: CPT

## 2025-03-06 ASSESSMENT — PAIN SCALES - GENERAL
PAINLEVEL_OUTOF10: 0
PAINLEVEL_OUTOF10: 0

## 2025-03-06 ASSESSMENT — PAIN - FUNCTIONAL ASSESSMENT: PAIN_FUNCTIONAL_ASSESSMENT: 0-10

## 2025-03-06 NOTE — ED NOTES
Applied 4x4 gauze to site, secured with tape. No additional drainage noted. Educated pt on dressing care per Dr. Soriano instruction. Pt tolerates well.

## 2025-03-06 NOTE — ED TRIAGE NOTES
Pt presents with bloody drainage from incision from spinal surgery at [place] performed on 3/1/25. Incision to lower back, mild drainage noted, no inflammation noted, sutures appear intact. Pt denies pain.

## 2025-03-06 NOTE — ED PROVIDER NOTES
eMERGENCY dEPARTMENT eNCOUnter      Pt Name: Cori Rojas  MRN: [id number]  Birthdate 2005  Date of evaluation: 3/6/2025  Provider: ANA MARIA LAUGHLIN MD     CHIEF COMPLAINT       Chief Complaint   Patient presents with    Post-op Problem     Pt presents with bloody drainage from incision from spinal surgery at [place] performed on 3/1/25. Incision to lower back, mild drainage noted, no inflammation noted, sutures appear intact. Pt denies pain.         HISTORY OF PRESENT ILLNESS   (Location/Symptom, Timing/Onset,Context/Setting, Quality, Duration, Modifying Factors, Severity) Note limiting factors.   HPI    Cori Rojas is a 19 y.o. male who presents to the emergency department with a possible dehiscence of his wound.  Patient states about 5 days ago patient at [place] and had a discectomy.  Patient was doing really well until he bent over to tie his shoes when he noticed some blood draining from the incision site.  Patient came in for evaluation.    Nursing Notes were reviewed.    REVIEW OFSYSTEMS    (2+ for level 4; 10+ for level 5)   Review of Systems    General: No fevers, chills or night sweats, No weight loss    Head:  No Sore throat,  No Ear Pain    Chest:  Nontender.  No Cough, No SOB,  Chest Pain    GI: No abdominal pain or vomiting    : No dysuria or hematuria    Musculoskeletal: No unrelenting pain or night pain    Neurologic: No bowel or bladder incontinence, No saddle anesthesia, No leg weakness    All other systems reviewed and are negative.        PAST MEDICAL HISTORY     Past Medical History:   Diagnosis Date    Asthma     High risk sexual behavior        SURGICAL HISTORY       Past Surgical History:   Procedure Laterality Date    LUMBAR SPINE SURGERY N/A 2/27/2025    LUMBAR 5 - SACRAL 1 CENTRAL LAMINECTOMY AND DISCECTOMY performed by Abdifatah Caban MD at [facility] OR       CURRENT MEDICATIONS       Previous Medications    DEXAMETHASONE (DECADRON) 4 MG TABLET    Take 1 tablet by mouth every 6 hours for 2

## 2025-03-10 ENCOUNTER — HOSPITAL ENCOUNTER (EMERGENCY)
Age: 20
Discharge: HOME | End: 2025-03-10
Payer: COMMERCIAL

## 2025-03-10 VITALS
BODY MASS INDEX: 24.89 KG/M2 | RESPIRATION RATE: 16 BRPM | TEMPERATURE: 98.1 F | SYSTOLIC BLOOD PRESSURE: 144 MMHG | HEART RATE: 94 BPM | HEIGHT: 75 IN | WEIGHT: 200.18 LBS | OXYGEN SATURATION: 99 % | DIASTOLIC BLOOD PRESSURE: 72 MMHG

## 2025-03-10 DIAGNOSIS — Z48.89 ENCOUNTER FOR POST SURGICAL WOUND CHECK: Primary | ICD-10-CM

## 2025-03-10 PROCEDURE — 99283 EMERGENCY DEPT VISIT LOW MDM: CPT

## 2025-03-10 PROCEDURE — 12031 INTMD RPR S/A/T/EXT 2.5 CM/<: CPT

## 2025-03-10 RX ORDER — DOXYCYCLINE HYCLATE 100 MG
100 TABLET ORAL 2 TIMES DAILY
Qty: 20 TABLET | Refills: 0 | Status: SHIPPED | OUTPATIENT
Start: 2025-03-10 | End: 2025-03-20

## 2025-03-10 ASSESSMENT — PAIN - FUNCTIONAL ASSESSMENT: PAIN_FUNCTIONAL_ASSESSMENT: NONE - DENIES PAIN

## 2025-03-10 NOTE — ED PROVIDER NOTES
[facility] EMERGENCY DEPARTMENT  EMERGENCY DEPARTMENT ENCOUNTER        Pt Name: Cori Rojas  MRN: [id number]  Birthdate 2005  Date of evaluation: 3/10/2025  Provider: Marcella Denny PA-C  PCP: No primary care provider on file.  Note Started: 7:40 PM EDT 3/10/25      SANDY. I have evaluated this patient.        CHIEF COMPLAINT       Chief Complaint   Patient presents with    Wound Check     States his incision to his lower back from surgery 2/28/2025 started to bleed today after showering.       HISTORY OF PRESENT ILLNESS: 1 or more Elements     History From: patient  Limitations to history : None    Cori Rojas is a 19 y.o. male who presents to the emergency department by private vehicle.  Patient had a L5/S1 central laminectomy and discectomy on 2/27/2025 by Dr. Caban.  Patient states when he got home he picked a scab on his incision.  He states since picking the scab he has had intermittent bleeding at the site of his incision.  He has been seen in the ED twice before for bleeding.  After shower this evening he noticed small amount of bleeding.  He became concerned and sought evaluation in the ED. He denies any fevers, chills.  He denies any increased pain, swelling, color changes, drainage (apart from blood) from incision.  Overall feels well and has no other complaints this time.    Nursing Notes were all reviewed and agreed with or any disagreements were addressed in the HPI.    REVIEW OF SYSTEMS :      Review of Systems    Positives and Pertinent negatives as per HPI.     SURGICAL HISTORY     Past Surgical History:   Procedure Laterality Date    LUMBAR SPINE SURGERY N/A 2/27/2025    LUMBAR 5 - SACRAL 1 CENTRAL LAMINECTOMY AND DISCECTOMY performed by Abdifatah Caban MD at [facility] OR       CURRENTMEDICATIONS       Discharge Medication List as of 3/10/2025  7:03 PM        CONTINUE these medications which have NOT CHANGED    Details   polyethylene glycol (GLYCOLAX) 17 g packet Take 1 packet by mouth

## 2025-03-10 NOTE — DISCHARGE INSTRUCTIONS
You need to follow-up with Dr. Caban as an outpatient.  He would like to see you in the office on Friday.  Call their office tomorrow to schedule this appointment.  It is pertinent that you follow-up with him.  When you peeled off your scab that was really your suture and your Dermabond.  Your wound is going to have to heal and slowly over time.  There is no additional stitch we can put in this at this time.  You need to clean the wound with tap water and soap at home twice a day.  Take your antibiotics as prescribed.  Follow-up with the wound care clinic and Dr. Caban as discussed.  If you begin having increased pain, pus drainage, fevers seek reevaluation.

## 2025-03-10 NOTE — ED NOTES
Incision to lumbar area intact.  Bleeding noted from distal end.  Gauze bandage applied to area.  Patient states he has follow up appointment 3/25/2025

## 2025-03-10 NOTE — ED NOTES
Gauze bandage reapplied to lower back incision line.  Gauze sent home with patient.  AVS reviewed with patient.  Verbalized understanding.   AVS was printed and given to patient.  Prescriptions sent electronically to patients pharmacy of choice.

## 2025-04-04 ENCOUNTER — APPOINTMENT (OUTPATIENT)
Dept: MRI IMAGING | Age: 20
DRG: 711 | End: 2025-04-04
Payer: COMMERCIAL

## 2025-04-04 ENCOUNTER — HOSPITAL ENCOUNTER (INPATIENT)
Age: 20
LOS: 4 days | Discharge: HOME | DRG: 711 | End: 2025-04-08
Attending: STUDENT IN AN ORGANIZED HEALTH CARE EDUCATION/TRAINING PROGRAM | Admitting: STUDENT IN AN ORGANIZED HEALTH CARE EDUCATION/TRAINING PROGRAM
Payer: COMMERCIAL

## 2025-04-04 ENCOUNTER — APPOINTMENT (OUTPATIENT)
Dept: CT IMAGING | Age: 20
DRG: 711 | End: 2025-04-04
Payer: COMMERCIAL

## 2025-04-04 DIAGNOSIS — T14.8XXA WOUND INFECTION: Primary | ICD-10-CM

## 2025-04-04 DIAGNOSIS — T81.30XA WOUND DEHISCENCE: ICD-10-CM

## 2025-04-04 DIAGNOSIS — Z98.890 STATUS POST LUMBAR SURGERY: ICD-10-CM

## 2025-04-04 DIAGNOSIS — L08.9 WOUND INFECTION: Primary | ICD-10-CM

## 2025-04-04 PROBLEM — T81.49XA POSTOPERATIVE WOUND INFECTION: Status: ACTIVE | Noted: 2025-04-04

## 2025-04-04 LAB
ABO/RH: NORMAL
ANION GAP SERPL CALCULATED.3IONS-SCNC: 11 MMOL/L (ref 3–16)
ANTIBODY SCREEN: NORMAL
BASOPHILS # BLD: 0 K/UL (ref 0–0.2)
BASOPHILS NFR BLD: 0.4 %
BUN SERPL-MCNC: 10 MG/DL (ref 7–20)
CALCIUM SERPL-MCNC: 9 MG/DL (ref 8.3–10.6)
CHLORIDE SERPL-SCNC: 102 MMOL/L (ref 99–110)
CO2 SERPL-SCNC: 26 MMOL/L (ref 21–32)
CREAT SERPL-MCNC: 0.8 MG/DL (ref 0.9–1.3)
CRP SERPL-MCNC: <3 MG/L (ref 0–5.1)
DEPRECATED RDW RBC AUTO: 13.8 % (ref 12.4–15.4)
EOSINOPHIL # BLD: 0.2 K/UL (ref 0–0.6)
EOSINOPHIL NFR BLD: 2.4 %
ERYTHROCYTE [SEDIMENTATION RATE] IN BLOOD BY WESTERGREN METHOD: <1 MM/HR (ref 0–15)
GFR SERPLBLD CREATININE-BSD FMLA CKD-EPI: >90 ML/MIN/{1.73_M2}
GLUCOSE SERPL-MCNC: 101 MG/DL (ref 70–99)
HCT VFR BLD AUTO: 47.1 % (ref 40.5–52.5)
HGB BLD-MCNC: 16 G/DL (ref 13.5–17.5)
INR PPP: 0.95 (ref 0.85–1.15)
LYMPHOCYTES # BLD: 2.1 K/UL (ref 1–5.1)
LYMPHOCYTES NFR BLD: 21.6 %
MCH RBC QN AUTO: 30.2 PG (ref 26–34)
MCHC RBC AUTO-ENTMCNC: 34 G/DL (ref 31–36)
MCV RBC AUTO: 88.8 FL (ref 80–100)
MONOCYTES # BLD: 0.7 K/UL (ref 0–1.3)
MONOCYTES NFR BLD: 7.4 %
NEUTROPHILS # BLD: 6.8 K/UL (ref 1.7–7.7)
NEUTROPHILS NFR BLD: 68.2 %
PLATELET # BLD AUTO: 411 K/UL (ref 135–450)
PMV BLD AUTO: 9 FL (ref 5–10.5)
POTASSIUM SERPL-SCNC: 4 MMOL/L (ref 3.5–5.1)
PROTHROMBIN TIME: 12.8 SEC (ref 11.9–14.9)
RBC # BLD AUTO: 5.3 M/UL (ref 4.2–5.9)
SODIUM SERPL-SCNC: 139 MMOL/L (ref 136–145)
WBC # BLD AUTO: 10 K/UL (ref 4–11)

## 2025-04-04 PROCEDURE — 85610 PROTHROMBIN TIME: CPT

## 2025-04-04 PROCEDURE — 6360000004 HC RX CONTRAST MEDICATION: Performed by: NURSE PRACTITIONER

## 2025-04-04 PROCEDURE — 6360000002 HC RX W HCPCS

## 2025-04-04 PROCEDURE — A9576 INJ PROHANCE MULTIPACK: HCPCS | Performed by: NURSE PRACTITIONER

## 2025-04-04 PROCEDURE — 1200000000 HC SEMI PRIVATE

## 2025-04-04 PROCEDURE — 2500000003 HC RX 250 WO HCPCS: Performed by: STUDENT IN AN ORGANIZED HEALTH CARE EDUCATION/TRAINING PROGRAM

## 2025-04-04 PROCEDURE — 36415 COLL VENOUS BLD VENIPUNCTURE: CPT

## 2025-04-04 PROCEDURE — 85025 COMPLETE CBC W/AUTO DIFF WBC: CPT

## 2025-04-04 PROCEDURE — 72158 MRI LUMBAR SPINE W/O & W/DYE: CPT

## 2025-04-04 PROCEDURE — 85652 RBC SED RATE AUTOMATED: CPT

## 2025-04-04 PROCEDURE — 99285 EMERGENCY DEPT VISIT HI MDM: CPT

## 2025-04-04 PROCEDURE — 2580000003 HC RX 258

## 2025-04-04 PROCEDURE — 86850 RBC ANTIBODY SCREEN: CPT

## 2025-04-04 PROCEDURE — 80048 BASIC METABOLIC PNL TOTAL CA: CPT

## 2025-04-04 PROCEDURE — 86140 C-REACTIVE PROTEIN: CPT

## 2025-04-04 PROCEDURE — 99254 IP/OBS CNSLTJ NEW/EST MOD 60: CPT | Performed by: NURSE PRACTITIONER

## 2025-04-04 PROCEDURE — 86900 BLOOD TYPING SEROLOGIC ABO: CPT

## 2025-04-04 PROCEDURE — 86901 BLOOD TYPING SEROLOGIC RH(D): CPT

## 2025-04-04 RX ORDER — SODIUM CHLORIDE 0.9 % (FLUSH) 0.9 %
5-40 SYRINGE (ML) INJECTION EVERY 12 HOURS SCHEDULED
Status: DISCONTINUED | OUTPATIENT
Start: 2025-04-04 | End: 2025-04-05

## 2025-04-04 RX ORDER — SODIUM CHLORIDE 0.9 % (FLUSH) 0.9 %
5-40 SYRINGE (ML) INJECTION PRN
Status: DISCONTINUED | OUTPATIENT
Start: 2025-04-04 | End: 2025-04-05

## 2025-04-04 RX ORDER — ONDANSETRON 4 MG/1
4 TABLET, ORALLY DISINTEGRATING ORAL EVERY 8 HOURS PRN
Status: DISCONTINUED | OUTPATIENT
Start: 2025-04-04 | End: 2025-04-05

## 2025-04-04 RX ORDER — MAGNESIUM SULFATE IN WATER 40 MG/ML
2000 INJECTION, SOLUTION INTRAVENOUS PRN
Status: DISCONTINUED | OUTPATIENT
Start: 2025-04-04 | End: 2025-04-08 | Stop reason: HOSPADM

## 2025-04-04 RX ORDER — POTASSIUM CHLORIDE 7.45 MG/ML
10 INJECTION INTRAVENOUS PRN
Status: DISCONTINUED | OUTPATIENT
Start: 2025-04-04 | End: 2025-04-08 | Stop reason: HOSPADM

## 2025-04-04 RX ORDER — IOPAMIDOL 755 MG/ML
75 INJECTION, SOLUTION INTRAVASCULAR
Status: DISCONTINUED | OUTPATIENT
Start: 2025-04-04 | End: 2025-04-05

## 2025-04-04 RX ORDER — ENOXAPARIN SODIUM 100 MG/ML
40 INJECTION SUBCUTANEOUS DAILY
Status: DISCONTINUED | OUTPATIENT
Start: 2025-04-05 | End: 2025-04-04

## 2025-04-04 RX ORDER — SODIUM CHLORIDE 9 MG/ML
INJECTION, SOLUTION INTRAVENOUS PRN
Status: DISCONTINUED | OUTPATIENT
Start: 2025-04-04 | End: 2025-04-05

## 2025-04-04 RX ORDER — ACETAMINOPHEN 650 MG/1
650 SUPPOSITORY RECTAL EVERY 6 HOURS PRN
Status: DISCONTINUED | OUTPATIENT
Start: 2025-04-04 | End: 2025-04-05

## 2025-04-04 RX ORDER — POTASSIUM CHLORIDE 1500 MG/1
40 TABLET, EXTENDED RELEASE ORAL PRN
Status: DISCONTINUED | OUTPATIENT
Start: 2025-04-04 | End: 2025-04-08 | Stop reason: HOSPADM

## 2025-04-04 RX ORDER — POLYETHYLENE GLYCOL 3350 17 G/17G
17 POWDER, FOR SOLUTION ORAL DAILY PRN
Status: DISCONTINUED | OUTPATIENT
Start: 2025-04-04 | End: 2025-04-05

## 2025-04-04 RX ORDER — ACETAMINOPHEN 325 MG/1
650 TABLET ORAL EVERY 6 HOURS PRN
Status: DISCONTINUED | OUTPATIENT
Start: 2025-04-04 | End: 2025-04-05

## 2025-04-04 RX ORDER — ONDANSETRON 2 MG/ML
4 INJECTION INTRAMUSCULAR; INTRAVENOUS EVERY 6 HOURS PRN
Status: DISCONTINUED | OUTPATIENT
Start: 2025-04-04 | End: 2025-04-05

## 2025-04-04 RX ADMIN — SODIUM CHLORIDE, PRESERVATIVE FREE 10 ML: 5 INJECTION INTRAVENOUS at 21:31

## 2025-04-04 RX ADMIN — GADOTERIDOL 18 ML: 279.3 INJECTION, SOLUTION INTRAVENOUS at 18:46

## 2025-04-04 RX ADMIN — VANCOMYCIN HYDROCHLORIDE 2250 MG: 10 INJECTION, POWDER, LYOPHILIZED, FOR SOLUTION INTRAVENOUS at 19:13

## 2025-04-04 RX ADMIN — PIPERACILLIN AND TAZOBACTAM 4500 MG: 4; .5 INJECTION, POWDER, FOR SOLUTION INTRAVENOUS; PARENTERAL at 17:30

## 2025-04-04 ASSESSMENT — PAIN SCALES - GENERAL: PAINLEVEL_OUTOF10: 0

## 2025-04-04 ASSESSMENT — PAIN - FUNCTIONAL ASSESSMENT: PAIN_FUNCTIONAL_ASSESSMENT: 0-10

## 2025-04-04 NOTE — ED NOTES
Patient Name: Cori Rojas  : 2005 19 y.o.  MRN: [id number]  ED Room #: SRG4/SRG4-04     Chief complaint:   Chief Complaint   Patient presents with    Wound Check     Patient stated that he had sx  in his lower back \"for a slipped disc or something\", stated that his incision is open and he was told to come in by his surgeon.      Hospital Problem/Diagnosis:   Hospital Problems           Last Modified POA    * (Principal) Postoperative wound infection 2025 Yes         O2 Flow Rate:    (if applicable)  Cardiac Rhythm:   (if applicable)  Active LDA's:           How does patient ambulate? Low Fall Risk (Ambulates by themselves without support    2. How does patient take pills? Unknown, no oral medications were given in the Emergency Department    3. Is patient alert? Alert    4. Is patient oriented? To Person, To Place, To Time, To Situation, and Follows Commands    5.   Patient arrived from:  home  Facility Name: ___________________________________________    6. If patient is disoriented or from a Skill Nursing Facility has family been notified of admission? No    7. Patient belongings? Belongings: Cell Phone and Clothing    Disposition of belongings? Kept with Patient     8. Any specific patient or family belongings/needs/dynamics?   a. N/a     9. Miscellaneous comments/pending orders?  a. Pt aox4 and indepednent. Pt had recent back surgery and ripped glue off. PT sent by surgeon for concerned infection at wound site. Pt reports no pain or fevers at this time     If there are any additional questions please reach out to the Emergency Department.      Elizabeth De Luna, JOSE  [phone]

## 2025-04-04 NOTE — ED PROVIDER NOTES
ED Attending Attestation Note     Date of evaluation: 4/4/2025    This patient was seen by the resident.  I have seen and examined the patient, agree with the workup, evaluation, management and diagnosis. The care plan has been discussed.  I have reviewed the ECG and concur with the resident's interpretation.  My assessment reveals well-appearing male sitting on a chair.  Patient underwent spinal surgery previously this month and now presents with an open wound on his back.  Discussed patient with neurosurgery who requested that he be admitted for IV antibiotics for potential washout.  Patient started on Vanco and Zosyn.  Patient does not show signs of sepsis or systemic illness.          Kimmy Magallanes MD  04/04/25 [id number]    
no apparent distress.  Head: Normocephalic, atraumatic.   Eyes: Anicteric. PERRL, EOMI.  ENT: No nasal discharge. Mucous membranes are moist.  Neck: Supple, full ROM, trachea midline.   Pulmonary: Non-labored breathing. Lungs clear to auscultation bilaterally with symmetric aeration. No wheezes/rales/rhonchi.   Cardiac: Regular rate and rhythm. No murmurs/rubs/gallops.  Abdomen: Soft, non-tender, non-distended. No rebound or guarding.  Musculoskeletal: No long bone extremity deformity.   Extremities: Warm and well-perfused. No peripheral edema.  Skin: No rashes or bruising.  Neuro: Alert and oriented x3. Speech normal. Moves UE and LE spontaneously and symmetrically.  5 out of 5 strength bilaterally in upper and lower extremities.  Cranial nerves II through XII intact.  4 cm lumbar midline incision with wound dehiscence at the superior aspect for approximately 1 cm and half a centimeter at the inferior aspect with active purulent serous drainage.  Gauze that was present on the wound is soaked with green-brown drainage.  Psych: Mood and affect appropriate for situation.           Jamin Kumar MD  Resident  04/04/25 1932

## 2025-04-04 NOTE — PLAN OF CARE
[facility] brief note  Consult received.  Case reviewed with ER physician    Full note to follow.    No primary care provider on file.    doNOT admit for   Private PCP group  Dr Franklin admits for:  Erik Uriarte admits for  Dhara Moore Vanessa)    Thanks  Liban Barreto MD

## 2025-04-04 NOTE — H&P
on 4/4/2025   --------------------------------------------------------------------------------------------------------------------------------------------------------------------    Imaging:     No results found.    PCP: No primary care provider on file.    Past Medical History:        Diagnosis Date    Asthma     High risk sexual behavior        Past Surgical History:        Procedure Laterality Date    LUMBAR SPINE SURGERY N/A 2/27/2025    LUMBAR 5 - SACRAL 1 CENTRAL LAMINECTOMY AND DISCECTOMY performed by Abdifatah Caban MD at [facility] OR       Medications Prior to Admission:   Prior to Admission medications    Medication Sig Start Date End Date Taking? Authorizing Provider   sennosides-docusate sodium (SENOKOT-S) 8.6-50 MG tablet Take 1 tablet by mouth 2 times daily  Patient not taking: Reported on 3/10/2025 2/28/25   Christian Covarrubias, ASHUTOSH - CNP       Labs: Personally reviewed and interpreted for clinical significance.   No results for input(s): \"WBC\", \"HGB\", \"HCT\", \"PLT\" in the last 72 hours.  No results for input(s): \"NA\", \"K\", \"CL\", \"CO2\", \"BUN\", \"CREATININE\", \"CALCIUM\", \"MG\", \"PHOS\" in the last 72 hours.  No results for input(s): \"PROBNP\", \"TROPHS\" in the last 72 hours.  No results for input(s): \"LABA1C\" in the last 72 hours.  No results for input(s): \"AST\", \"ALT\", \"BILIDIR\", \"BILITOT\", \"ALKPHOS\" in the last 72 hours.  No results for input(s): \"INR\", \"LACTA\", \"TSH\" in the last 72 hours.     Liban Barreto MD

## 2025-04-04 NOTE — PLAN OF CARE
Neurosurgery Brief Plan of Care    20 yo pt s/p L5-S1 laminectomy and discectomy on 2/27/25 with known wound dehiscence, lost to follow up who has been directed to come to ED for evaluation-presents to ED today for wound check    Plan:  Hospitalist to admit  MRI lumbar spine w wo contrast  Hold on antibiotics for now  Send CBC, CMP, sed rate, CRP  NPO  @ 0000 for possible wound washout tmw w/ Dr. Caban  Full consult to follow    Pt discussed w/ Dr. Caban who agrees with above assessment and plan     Electronically signed by: ASHUTOSH Duron - NP

## 2025-04-05 ENCOUNTER — ANESTHESIA EVENT (OUTPATIENT)
Dept: OPERATING ROOM | Age: 20
End: 2025-04-05
Payer: COMMERCIAL

## 2025-04-05 ENCOUNTER — ANESTHESIA (OUTPATIENT)
Dept: OPERATING ROOM | Age: 20
End: 2025-04-05
Payer: COMMERCIAL

## 2025-04-05 LAB
ANION GAP SERPL CALCULATED.3IONS-SCNC: 10 MMOL/L (ref 3–16)
BASOPHILS # BLD: 0.1 K/UL (ref 0–0.2)
BASOPHILS NFR BLD: 0.7 %
BUN SERPL-MCNC: 8 MG/DL (ref 7–20)
CALCIUM SERPL-MCNC: 9.5 MG/DL (ref 8.3–10.6)
CHLORIDE SERPL-SCNC: 101 MMOL/L (ref 99–110)
CO2 SERPL-SCNC: 29 MMOL/L (ref 21–32)
CREAT SERPL-MCNC: 0.8 MG/DL (ref 0.9–1.3)
DEPRECATED RDW RBC AUTO: 13.3 % (ref 12.4–15.4)
EOSINOPHIL # BLD: 0.2 K/UL (ref 0–0.6)
EOSINOPHIL NFR BLD: 3.2 %
GFR SERPLBLD CREATININE-BSD FMLA CKD-EPI: >90 ML/MIN/{1.73_M2}
GLUCOSE SERPL-MCNC: 91 MG/DL (ref 70–99)
HCT VFR BLD AUTO: 46.4 % (ref 40.5–52.5)
HGB BLD-MCNC: 15.6 G/DL (ref 13.5–17.5)
INR PPP: 1.04 (ref 0.85–1.15)
LYMPHOCYTES # BLD: 1.6 K/UL (ref 1–5.1)
LYMPHOCYTES NFR BLD: 21.4 %
MCH RBC QN AUTO: 30.7 PG (ref 26–34)
MCHC RBC AUTO-ENTMCNC: 33.6 G/DL (ref 31–36)
MCV RBC AUTO: 91.2 FL (ref 80–100)
MONOCYTES # BLD: 0.8 K/UL (ref 0–1.3)
MONOCYTES NFR BLD: 10.3 %
NEUTROPHILS # BLD: 5 K/UL (ref 1.7–7.7)
NEUTROPHILS NFR BLD: 64.4 %
PLATELET # BLD AUTO: 359 K/UL (ref 135–450)
PMV BLD AUTO: 8.9 FL (ref 5–10.5)
POTASSIUM SERPL-SCNC: 3.8 MMOL/L (ref 3.5–5.1)
PROTHROMBIN TIME: 13.8 SEC (ref 11.9–14.9)
RBC # BLD AUTO: 5.09 M/UL (ref 4.2–5.9)
SODIUM SERPL-SCNC: 140 MMOL/L (ref 136–145)
WBC # BLD AUTO: 7.7 K/UL (ref 4–11)

## 2025-04-05 PROCEDURE — 85025 COMPLETE CBC W/AUTO DIFF WBC: CPT

## 2025-04-05 PROCEDURE — 6360000002 HC RX W HCPCS: Performed by: ANESTHESIOLOGY

## 2025-04-05 PROCEDURE — 6370000000 HC RX 637 (ALT 250 FOR IP): Performed by: NEUROLOGICAL SURGERY

## 2025-04-05 PROCEDURE — 6370000000 HC RX 637 (ALT 250 FOR IP): Performed by: STUDENT IN AN ORGANIZED HEALTH CARE EDUCATION/TRAINING PROGRAM

## 2025-04-05 PROCEDURE — 2580000003 HC RX 258: Performed by: FAMILY MEDICINE

## 2025-04-05 PROCEDURE — 2580000003 HC RX 258: Performed by: NURSE ANESTHETIST, CERTIFIED REGISTERED

## 2025-04-05 PROCEDURE — 3700000001 HC ADD 15 MINUTES (ANESTHESIA): Performed by: NEUROLOGICAL SURGERY

## 2025-04-05 PROCEDURE — 87205 SMEAR GRAM STAIN: CPT

## 2025-04-05 PROCEDURE — 2500000003 HC RX 250 WO HCPCS: Performed by: NEUROLOGICAL SURGERY

## 2025-04-05 PROCEDURE — 6370000000 HC RX 637 (ALT 250 FOR IP): Performed by: NURSE PRACTITIONER

## 2025-04-05 PROCEDURE — 2720000010 HC SURG SUPPLY STERILE: Performed by: NEUROLOGICAL SURGERY

## 2025-04-05 PROCEDURE — 87070 CULTURE OTHR SPECIMN AEROBIC: CPT

## 2025-04-05 PROCEDURE — 2500000003 HC RX 250 WO HCPCS: Performed by: NURSE ANESTHETIST, CERTIFIED REGISTERED

## 2025-04-05 PROCEDURE — 2500000003 HC RX 250 WO HCPCS: Performed by: STUDENT IN AN ORGANIZED HEALTH CARE EDUCATION/TRAINING PROGRAM

## 2025-04-05 PROCEDURE — 3700000000 HC ANESTHESIA ATTENDED CARE: Performed by: NEUROLOGICAL SURGERY

## 2025-04-05 PROCEDURE — 00D20ZZ EXTRACTION OF DURA MATER, OPEN APPROACH: ICD-10-PCS | Performed by: NEUROLOGICAL SURGERY

## 2025-04-05 PROCEDURE — 1200000000 HC SEMI PRIVATE

## 2025-04-05 PROCEDURE — 3600000014 HC SURGERY LEVEL 4 ADDTL 15MIN: Performed by: NEUROLOGICAL SURGERY

## 2025-04-05 PROCEDURE — 6360000002 HC RX W HCPCS: Performed by: FAMILY MEDICINE

## 2025-04-05 PROCEDURE — 7100000000 HC PACU RECOVERY - FIRST 15 MIN: Performed by: NEUROLOGICAL SURGERY

## 2025-04-05 PROCEDURE — 6360000002 HC RX W HCPCS: Performed by: NEUROLOGICAL SURGERY

## 2025-04-05 PROCEDURE — 7100000001 HC PACU RECOVERY - ADDTL 15 MIN: Performed by: NEUROLOGICAL SURGERY

## 2025-04-05 PROCEDURE — 87075 CULTR BACTERIA EXCEPT BLOOD: CPT

## 2025-04-05 PROCEDURE — 87077 CULTURE AEROBIC IDENTIFY: CPT

## 2025-04-05 PROCEDURE — 36415 COLL VENOUS BLD VENIPUNCTURE: CPT

## 2025-04-05 PROCEDURE — 6360000002 HC RX W HCPCS: Performed by: NURSE ANESTHETIST, CERTIFIED REGISTERED

## 2025-04-05 PROCEDURE — 3600000004 HC SURGERY LEVEL 4 BASE: Performed by: NEUROLOGICAL SURGERY

## 2025-04-05 PROCEDURE — 80048 BASIC METABOLIC PNL TOTAL CA: CPT

## 2025-04-05 PROCEDURE — 87102 FUNGUS ISOLATION CULTURE: CPT

## 2025-04-05 PROCEDURE — 85610 PROTHROMBIN TIME: CPT

## 2025-04-05 PROCEDURE — 2709999900 HC NON-CHARGEABLE SUPPLY: Performed by: NEUROLOGICAL SURGERY

## 2025-04-05 PROCEDURE — 2500000003 HC RX 250 WO HCPCS: Performed by: ANESTHESIOLOGY

## 2025-04-05 PROCEDURE — 2580000003 HC RX 258: Performed by: NEUROLOGICAL SURGERY

## 2025-04-05 RX ORDER — HEPARIN SODIUM 5000 [USP'U]/ML
5000 INJECTION, SOLUTION INTRAVENOUS; SUBCUTANEOUS EVERY 8 HOURS SCHEDULED
Status: DISCONTINUED | OUTPATIENT
Start: 2025-04-06 | End: 2025-04-08 | Stop reason: HOSPADM

## 2025-04-05 RX ORDER — HYDROMORPHONE HYDROCHLORIDE 1 MG/ML
1 INJECTION, SOLUTION INTRAMUSCULAR; INTRAVENOUS; SUBCUTANEOUS
Status: DISCONTINUED | OUTPATIENT
Start: 2025-04-05 | End: 2025-04-07

## 2025-04-05 RX ORDER — SODIUM CHLORIDE, SODIUM LACTATE, POTASSIUM CHLORIDE, CALCIUM CHLORIDE 600; 310; 30; 20 MG/100ML; MG/100ML; MG/100ML; MG/100ML
INJECTION, SOLUTION INTRAVENOUS
Status: DISCONTINUED | OUTPATIENT
Start: 2025-04-05 | End: 2025-04-05 | Stop reason: SDUPTHER

## 2025-04-05 RX ORDER — PROCHLORPERAZINE EDISYLATE 5 MG/ML
5 INJECTION INTRAMUSCULAR; INTRAVENOUS
Status: DISCONTINUED | OUTPATIENT
Start: 2025-04-05 | End: 2025-04-05 | Stop reason: HOSPADM

## 2025-04-05 RX ORDER — ROCURONIUM BROMIDE 10 MG/ML
INJECTION, SOLUTION INTRAVENOUS
Status: DISCONTINUED | OUTPATIENT
Start: 2025-04-05 | End: 2025-04-05 | Stop reason: SDUPTHER

## 2025-04-05 RX ORDER — OXYCODONE HYDROCHLORIDE 5 MG/1
5 TABLET ORAL EVERY 4 HOURS PRN
Status: DISCONTINUED | OUTPATIENT
Start: 2025-04-05 | End: 2025-04-08 | Stop reason: HOSPADM

## 2025-04-05 RX ORDER — MEPERIDINE HYDROCHLORIDE 25 MG/ML
12.5 INJECTION INTRAMUSCULAR; INTRAVENOUS; SUBCUTANEOUS AS NEEDED
Status: DISCONTINUED | OUTPATIENT
Start: 2025-04-05 | End: 2025-04-05 | Stop reason: HOSPADM

## 2025-04-05 RX ORDER — LIDOCAINE HYDROCHLORIDE 20 MG/ML
INJECTION, SOLUTION INTRAVENOUS
Status: DISCONTINUED | OUTPATIENT
Start: 2025-04-05 | End: 2025-04-05 | Stop reason: SDUPTHER

## 2025-04-05 RX ORDER — ONDANSETRON 2 MG/ML
INJECTION INTRAMUSCULAR; INTRAVENOUS
Status: DISCONTINUED | OUTPATIENT
Start: 2025-04-05 | End: 2025-04-05 | Stop reason: SDUPTHER

## 2025-04-05 RX ORDER — METHOCARBAMOL 100 MG/ML
INJECTION, SOLUTION INTRAMUSCULAR; INTRAVENOUS
Status: DISCONTINUED | OUTPATIENT
Start: 2025-04-05 | End: 2025-04-05 | Stop reason: SDUPTHER

## 2025-04-05 RX ORDER — DIPHENHYDRAMINE HYDROCHLORIDE 50 MG/ML
12.5 INJECTION, SOLUTION INTRAMUSCULAR; INTRAVENOUS
Status: DISCONTINUED | OUTPATIENT
Start: 2025-04-05 | End: 2025-04-05 | Stop reason: HOSPADM

## 2025-04-05 RX ORDER — HYDROMORPHONE HYDROCHLORIDE 1 MG/ML
0.5 INJECTION, SOLUTION INTRAMUSCULAR; INTRAVENOUS; SUBCUTANEOUS
Status: DISCONTINUED | OUTPATIENT
Start: 2025-04-05 | End: 2025-04-07

## 2025-04-05 RX ORDER — SODIUM CHLORIDE 0.9 % (FLUSH) 0.9 %
5-40 SYRINGE (ML) INJECTION EVERY 12 HOURS SCHEDULED
Status: DISCONTINUED | OUTPATIENT
Start: 2025-04-05 | End: 2025-04-05 | Stop reason: HOSPADM

## 2025-04-05 RX ORDER — NALOXONE HYDROCHLORIDE 0.4 MG/ML
INJECTION, SOLUTION INTRAMUSCULAR; INTRAVENOUS; SUBCUTANEOUS PRN
Status: DISCONTINUED | OUTPATIENT
Start: 2025-04-05 | End: 2025-04-05 | Stop reason: HOSPADM

## 2025-04-05 RX ORDER — ONDANSETRON 2 MG/ML
4 INJECTION INTRAMUSCULAR; INTRAVENOUS
Status: DISCONTINUED | OUTPATIENT
Start: 2025-04-05 | End: 2025-04-05 | Stop reason: HOSPADM

## 2025-04-05 RX ORDER — PHENYLEPHRINE HYDROCHLORIDE 10 MG/ML
INJECTION INTRAVENOUS
Status: DISCONTINUED | OUTPATIENT
Start: 2025-04-05 | End: 2025-04-05 | Stop reason: SDUPTHER

## 2025-04-05 RX ORDER — MIDAZOLAM HYDROCHLORIDE 1 MG/ML
INJECTION, SOLUTION INTRAMUSCULAR; INTRAVENOUS
Status: DISCONTINUED | OUTPATIENT
Start: 2025-04-05 | End: 2025-04-05 | Stop reason: SDUPTHER

## 2025-04-05 RX ORDER — OXYCODONE HYDROCHLORIDE 5 MG/1
10 TABLET ORAL EVERY 4 HOURS PRN
Status: DISCONTINUED | OUTPATIENT
Start: 2025-04-05 | End: 2025-04-08 | Stop reason: HOSPADM

## 2025-04-05 RX ORDER — SODIUM CHLORIDE 0.9 % (FLUSH) 0.9 %
5-40 SYRINGE (ML) INJECTION PRN
Status: DISCONTINUED | OUTPATIENT
Start: 2025-04-05 | End: 2025-04-05 | Stop reason: HOSPADM

## 2025-04-05 RX ORDER — DIAZEPAM 5 MG/1
5 TABLET ORAL EVERY 6 HOURS PRN
Status: DISCONTINUED | OUTPATIENT
Start: 2025-04-05 | End: 2025-04-07

## 2025-04-05 RX ORDER — DEXAMETHASONE SODIUM PHOSPHATE 4 MG/ML
INJECTION, SOLUTION INTRA-ARTICULAR; INTRALESIONAL; INTRAMUSCULAR; INTRAVENOUS; SOFT TISSUE
Status: DISCONTINUED | OUTPATIENT
Start: 2025-04-05 | End: 2025-04-05 | Stop reason: SDUPTHER

## 2025-04-05 RX ORDER — SENNA AND DOCUSATE SODIUM 50; 8.6 MG/1; MG/1
1 TABLET, FILM COATED ORAL 2 TIMES DAILY
Status: DISCONTINUED | OUTPATIENT
Start: 2025-04-05 | End: 2025-04-08 | Stop reason: HOSPADM

## 2025-04-05 RX ORDER — HYDROMORPHONE HYDROCHLORIDE 1 MG/ML
0.5 INJECTION, SOLUTION INTRAMUSCULAR; INTRAVENOUS; SUBCUTANEOUS EVERY 5 MIN PRN
Status: DISCONTINUED | OUTPATIENT
Start: 2025-04-05 | End: 2025-04-05 | Stop reason: HOSPADM

## 2025-04-05 RX ORDER — METHOCARBAMOL 750 MG/1
750 TABLET, FILM COATED ORAL EVERY 6 HOURS PRN
Status: DISCONTINUED | OUTPATIENT
Start: 2025-04-06 | End: 2025-04-08 | Stop reason: HOSPADM

## 2025-04-05 RX ORDER — SODIUM CHLORIDE 9 MG/ML
INJECTION, SOLUTION INTRAVENOUS PRN
Status: DISCONTINUED | OUTPATIENT
Start: 2025-04-05 | End: 2025-04-05 | Stop reason: HOSPADM

## 2025-04-05 RX ORDER — SODIUM CHLORIDE 9 MG/ML
INJECTION, SOLUTION INTRAVENOUS PRN
Status: DISCONTINUED | OUTPATIENT
Start: 2025-04-05 | End: 2025-04-07 | Stop reason: SDUPTHER

## 2025-04-05 RX ORDER — DEXMEDETOMIDINE HYDROCHLORIDE 100 UG/ML
INJECTION, SOLUTION INTRAVENOUS
Status: DISCONTINUED | OUTPATIENT
Start: 2025-04-05 | End: 2025-04-05 | Stop reason: SDUPTHER

## 2025-04-05 RX ORDER — PROPOFOL 10 MG/ML
INJECTION, EMULSION INTRAVENOUS
Status: DISCONTINUED | OUTPATIENT
Start: 2025-04-05 | End: 2025-04-05 | Stop reason: SDUPTHER

## 2025-04-05 RX ORDER — BISACODYL 10 MG
10 SUPPOSITORY, RECTAL RECTAL DAILY PRN
Status: DISCONTINUED | OUTPATIENT
Start: 2025-04-05 | End: 2025-04-08 | Stop reason: HOSPADM

## 2025-04-05 RX ORDER — HYDRALAZINE HYDROCHLORIDE 20 MG/ML
10 INJECTION INTRAMUSCULAR; INTRAVENOUS
Status: DISCONTINUED | OUTPATIENT
Start: 2025-04-05 | End: 2025-04-05 | Stop reason: HOSPADM

## 2025-04-05 RX ORDER — NALOXONE HYDROCHLORIDE 0.4 MG/ML
0.2 INJECTION, SOLUTION INTRAMUSCULAR; INTRAVENOUS; SUBCUTANEOUS PRN
Status: DISCONTINUED | OUTPATIENT
Start: 2025-04-05 | End: 2025-04-08 | Stop reason: HOSPADM

## 2025-04-05 RX ORDER — HYDROMORPHONE HYDROCHLORIDE 1 MG/ML
0.25 INJECTION, SOLUTION INTRAMUSCULAR; INTRAVENOUS; SUBCUTANEOUS EVERY 5 MIN PRN
Status: DISCONTINUED | OUTPATIENT
Start: 2025-04-05 | End: 2025-04-05 | Stop reason: HOSPADM

## 2025-04-05 RX ORDER — ONDANSETRON 2 MG/ML
4 INJECTION INTRAMUSCULAR; INTRAVENOUS EVERY 6 HOURS PRN
Status: DISCONTINUED | OUTPATIENT
Start: 2025-04-05 | End: 2025-04-08 | Stop reason: HOSPADM

## 2025-04-05 RX ORDER — PROMETHAZINE HYDROCHLORIDE 25 MG/1
12.5 TABLET ORAL EVERY 6 HOURS PRN
Status: DISCONTINUED | OUTPATIENT
Start: 2025-04-05 | End: 2025-04-08 | Stop reason: HOSPADM

## 2025-04-05 RX ORDER — SODIUM CHLORIDE 9 MG/ML
INJECTION, SOLUTION INTRAVENOUS CONTINUOUS
Status: DISCONTINUED | OUTPATIENT
Start: 2025-04-05 | End: 2025-04-06

## 2025-04-05 RX ORDER — POLYETHYLENE GLYCOL 3350 17 G/17G
17 POWDER, FOR SOLUTION ORAL DAILY
Status: DISCONTINUED | OUTPATIENT
Start: 2025-04-06 | End: 2025-04-08 | Stop reason: HOSPADM

## 2025-04-05 RX ORDER — SODIUM CHLORIDE 0.9 % (FLUSH) 0.9 %
5-40 SYRINGE (ML) INJECTION EVERY 12 HOURS SCHEDULED
Status: DISCONTINUED | OUTPATIENT
Start: 2025-04-05 | End: 2025-04-08 | Stop reason: HOSPADM

## 2025-04-05 RX ORDER — SODIUM CHLORIDE 0.9 % (FLUSH) 0.9 %
5-40 SYRINGE (ML) INJECTION PRN
Status: DISCONTINUED | OUTPATIENT
Start: 2025-04-05 | End: 2025-04-08 | Stop reason: HOSPADM

## 2025-04-05 RX ORDER — MAGNESIUM HYDROXIDE/ALUMINUM HYDROXICE/SIMETHICONE 120; 1200; 1200 MG/30ML; MG/30ML; MG/30ML
15 SUSPENSION ORAL EVERY 6 HOURS PRN
Status: DISCONTINUED | OUTPATIENT
Start: 2025-04-05 | End: 2025-04-08 | Stop reason: HOSPADM

## 2025-04-05 RX ADMIN — Medication 6 MG: at 01:14

## 2025-04-05 RX ADMIN — METHOCARBAMOL 500 MG: 100 INJECTION, SOLUTION INTRAMUSCULAR; INTRAVENOUS at 16:54

## 2025-04-05 RX ADMIN — VANCOMYCIN HYDROCHLORIDE 1250 MG: 1.25 INJECTION, POWDER, LYOPHILIZED, FOR SOLUTION INTRAVENOUS at 09:25

## 2025-04-05 RX ADMIN — PROPOFOL 50 MG: 10 INJECTION, EMULSION INTRAVENOUS at 17:04

## 2025-04-05 RX ADMIN — MIDAZOLAM HYDROCHLORIDE 2 MG: 1 INJECTION, SOLUTION INTRAMUSCULAR; INTRAVENOUS at 15:39

## 2025-04-05 RX ADMIN — PHENYLEPHRINE HYDROCHLORIDE 100 MCG: 10 INJECTION, SOLUTION INTRAVENOUS at 16:25

## 2025-04-05 RX ADMIN — SENNOSIDES AND DOCUSATE SODIUM 1 TABLET: 50; 8.6 TABLET ORAL at 22:08

## 2025-04-05 RX ADMIN — HYDROMORPHONE HYDROCHLORIDE 1 MG: 1 INJECTION, SOLUTION INTRAMUSCULAR; INTRAVENOUS; SUBCUTANEOUS at 17:10

## 2025-04-05 RX ADMIN — METHOCARBAMOL 1000 MG: 100 INJECTION, SOLUTION INTRAMUSCULAR; INTRAVENOUS at 22:17

## 2025-04-05 RX ADMIN — ROCURONIUM BROMIDE 50 MG: 10 INJECTION, SOLUTION INTRAVENOUS at 15:39

## 2025-04-05 RX ADMIN — SODIUM CHLORIDE, PRESERVATIVE FREE 10 ML: 5 INJECTION INTRAVENOUS at 08:01

## 2025-04-05 RX ADMIN — SUGAMMADEX 200 MG: 100 INJECTION, SOLUTION INTRAVENOUS at 17:08

## 2025-04-05 RX ADMIN — DEXAMETHASONE SODIUM PHOSPHATE 4 MG: 4 INJECTION INTRA-ARTICULAR; INTRALESIONAL; INTRAMUSCULAR; INTRAVENOUS; SOFT TISSUE at 15:39

## 2025-04-05 RX ADMIN — SODIUM CHLORIDE, SODIUM LACTATE, POTASSIUM CHLORIDE, AND CALCIUM CHLORIDE: .6; .31; .03; .02 INJECTION, SOLUTION INTRAVENOUS at 15:29

## 2025-04-05 RX ADMIN — SODIUM CHLORIDE: 0.9 INJECTION, SOLUTION INTRAVENOUS at 22:14

## 2025-04-05 RX ADMIN — HYDROMORPHONE HYDROCHLORIDE 1 MG: 1 INJECTION, SOLUTION INTRAMUSCULAR; INTRAVENOUS; SUBCUTANEOUS at 15:39

## 2025-04-05 RX ADMIN — HYDROMORPHONE HYDROCHLORIDE 0.5 MG: 1 INJECTION, SOLUTION INTRAMUSCULAR; INTRAVENOUS; SUBCUTANEOUS at 18:30

## 2025-04-05 RX ADMIN — HYDROMORPHONE HYDROCHLORIDE 0.5 MG: 1 INJECTION, SOLUTION INTRAMUSCULAR; INTRAVENOUS; SUBCUTANEOUS at 18:10

## 2025-04-05 RX ADMIN — LIDOCAINE HYDROCHLORIDE 100 MG: 20 INJECTION, SOLUTION INTRAVENOUS at 15:39

## 2025-04-05 RX ADMIN — PIPERACILLIN AND TAZOBACTAM 3375 MG: 3; .375 INJECTION, POWDER, LYOPHILIZED, FOR SOLUTION INTRAVENOUS at 09:33

## 2025-04-05 RX ADMIN — PIPERACILLIN AND TAZOBACTAM 3375 MG: 3; .375 INJECTION, POWDER, LYOPHILIZED, FOR SOLUTION INTRAVENOUS at 18:51

## 2025-04-05 RX ADMIN — ACETAMINOPHEN 650 MG: 325 TABLET ORAL at 19:52

## 2025-04-05 RX ADMIN — DEXMEDETOMIDINE HYDROCHLORIDE 10 MCG: 100 INJECTION, SOLUTION INTRAVENOUS at 16:03

## 2025-04-05 RX ADMIN — PROPOFOL 200 MG: 10 INJECTION, EMULSION INTRAVENOUS at 15:39

## 2025-04-05 RX ADMIN — OXYCODONE 5 MG: 5 TABLET ORAL at 22:59

## 2025-04-05 RX ADMIN — ONDANSETRON 4 MG: 2 INJECTION INTRAMUSCULAR; INTRAVENOUS at 15:39

## 2025-04-05 RX ADMIN — HYDROMORPHONE HYDROCHLORIDE 0.5 MG: 1 INJECTION, SOLUTION INTRAMUSCULAR; INTRAVENOUS; SUBCUTANEOUS at 17:45

## 2025-04-05 RX ADMIN — VANCOMYCIN HYDROCHLORIDE 1250 MG: 1.25 INJECTION, POWDER, LYOPHILIZED, FOR SOLUTION INTRAVENOUS at 18:48

## 2025-04-05 RX ADMIN — DEXMEDETOMIDINE HYDROCHLORIDE 10 MCG: 100 INJECTION, SOLUTION INTRAVENOUS at 16:19

## 2025-04-05 ASSESSMENT — PAIN DESCRIPTION - ORIENTATION
ORIENTATION: LOWER
ORIENTATION: LOWER
ORIENTATION: LEFT;MID
ORIENTATION: LOWER
ORIENTATION: LOWER

## 2025-04-05 ASSESSMENT — PAIN DESCRIPTION - LOCATION
LOCATION: BACK

## 2025-04-05 ASSESSMENT — PAIN DESCRIPTION - DESCRIPTORS
DESCRIPTORS: BURNING;ACHING
DESCRIPTORS: ACHING

## 2025-04-05 ASSESSMENT — PAIN SCALES - GENERAL
PAINLEVEL_OUTOF10: 7
PAINLEVEL_OUTOF10: 0
PAINLEVEL_OUTOF10: 7
PAINLEVEL_OUTOF10: 4
PAINLEVEL_OUTOF10: 0
PAINLEVEL_OUTOF10: 7
PAINLEVEL_OUTOF10: 7
PAINLEVEL_OUTOF10: 5
PAINLEVEL_OUTOF10: 0
PAINLEVEL_OUTOF10: 6
PAINLEVEL_OUTOF10: 10
PAINLEVEL_OUTOF10: 10

## 2025-04-05 ASSESSMENT — LIFESTYLE VARIABLES: SMOKING_STATUS: 0

## 2025-04-05 ASSESSMENT — PAIN - FUNCTIONAL ASSESSMENT: PAIN_FUNCTIONAL_ASSESSMENT: NONE - DENIES PAIN

## 2025-04-05 NOTE — OP NOTE
laminectomy defect. Approaches to the disc space were made bilaterally without identification of any purulence or significant thecal sac compression. The right nerve root was followed along the previous foraminotomy and was noted to be decompressed. The wound was then aggressively non-excisionally debrided down to the dura using a #1 Penfield dissector. Vancomycin irrigation solution was then liberally used to irrigate the wound. The incision was then closed in layers using 0 PDS sutures in the fascia and 2-0 PDS sutures in the subcutaneous tissues. A medium hemovac drain was placed during the closure subfascially. The drain was tunneled away from the incision line and secured to the skin with a stitch. The skin was closed with staples and the wound was dressed a Prevena incisional wound vacuum. This was used to decrease tension and protect the closure. There was excellent seal for suction.     All needle, sponge, and instrument counts were correct. The patient was turned back to supine position onto a hospital bed and extubated without difficulty. He was taken to the PACU in stable condition. I affirm in accordance with CMS regulations that I was present and scrubbed for all critical portions of the case.     ESTIMATED BLOOD LOSS: 20 mL    IMPLANTS:   1. None.    SPECIMEN:   1. Culture swabs from subfascial tissue and fluid    DRAINS:   1. Medium Hemovac drain  2. Prevena wound vac    DISPOSITION:  PACU in stable condition.

## 2025-04-05 NOTE — ANESTHESIA PRE PROCEDURE
12/09/2021 12:32 PM           Anesthesia Evaluation  Patient summary reviewed   no history of anesthetic complications:   Airway: Mallampati: I  TM distance: >3 FB   Neck ROM: full  Mouth opening: > = 3 FB   Dental: normal exam         Pulmonary: breath sounds clear to auscultation  (+)           asthma (well controlled, last used inhaler when he was 9 year old.):     (-) rhonchi, wheezes, not a current smoker and no decreased breath sounds                           Cardiovascular:  Exercise tolerance: good (>4 METS)      (-) past MI, CABG/stent and murmur    NYHA Classification: I    Rhythm: regular  Rate: normal           Beta Blocker:  Not on Beta Blocker         Neuro/Psych:      (-) seizures, TIA and CVA            ROS comment: 2/27/25 had cauda equina syndrome    now with lumbar   wound dehiscence  for washout now  GI/Hepatic/Renal:        (-) liver disease and no renal disease       Endo/Other:        (-) diabetes mellitus, hypothyroidism, hyperthyroidism               Abdominal:         (-) obese       Vascular:          Other Findings:       Anesthesia Plan      general     ASA 2       Induction: intravenous.      Anesthetic plan and risks discussed with patient.    Use of blood products discussed with patient whom consented to blood products.    Plan discussed with CRNA.    Attending anesthesiologist reviewed and agrees with Preprocedure content            Epi Kurtz DO   4/5/2025

## 2025-04-05 NOTE — ANESTHESIA POSTPROCEDURE EVALUATION
Department of Anesthesiology  Postprocedure Note    Patient: Cori Rojas  MRN: [id number]  YOB: 2005  Date of evaluation: 4/5/2025    Procedure Summary       Date: 04/05/25 Room / Location: [address]    Anesthesia Start: 1529 Anesthesia Stop: 1723    Procedure: EXPLORATION AND WASHOUT OF LUMBAR WOUND AND PLACEMENT OF WOUND VAC (Back) Diagnosis:       Wound dehiscence      (Wound dehiscence [T81.30XA])    Surgeons: Abdifatah Caban MD Responsible Provider: Epi Kurtz DO    Anesthesia Type: general ASA Status: 2            Anesthesia Type: No value filed.    Barbi Phase I: Barbi Score: 8    Barbi Phase II:      Anesthesia Post Evaluation    Patient location during evaluation: PACU  Patient participation: complete - patient participated  Level of consciousness: awake and alert  Pain score: 0  Airway patency: patent  Nausea & Vomiting: no nausea and no vomiting  Cardiovascular status: hemodynamically stable  Respiratory status: acceptable  Hydration status: stable  Pain management: adequate and satisfactory to patient    No notable events documented.

## 2025-04-06 PROBLEM — Z98.890 STATUS POST LUMBAR SURGERY: Status: ACTIVE | Noted: 2025-04-06

## 2025-04-06 PROBLEM — Z86.19 HISTORY OF INFECTION DUE TO EXTENDED SPECTRUM BETA LACTAMASE (ESBL) PRODUCING BACTERIA: Status: ACTIVE | Noted: 2025-04-06

## 2025-04-06 PROBLEM — Z11.3 SCREEN FOR STD (SEXUALLY TRANSMITTED DISEASE): Status: ACTIVE | Noted: 2025-04-06

## 2025-04-06 PROBLEM — L08.9 WOUND INFECTION: Status: ACTIVE | Noted: 2025-04-06

## 2025-04-06 PROBLEM — T81.89XA LUMBAR SURGICAL WOUND FLUID COLLECTION: Status: ACTIVE | Noted: 2025-04-06

## 2025-04-06 PROBLEM — B99.9 INFECTION REQUIRING CONTACT ISOLATION PRECAUTIONS: Status: ACTIVE | Noted: 2025-04-06

## 2025-04-06 PROBLEM — T81.30XA WOUND DEHISCENCE: Status: ACTIVE | Noted: 2025-04-06

## 2025-04-06 PROBLEM — M51.06 LUMBAR DISC HERNIATION WITH MYELOPATHY: Status: ACTIVE | Noted: 2025-04-06

## 2025-04-06 PROBLEM — T14.8XXA WOUND INFECTION: Status: ACTIVE | Noted: 2025-04-06

## 2025-04-06 LAB
ANION GAP SERPL CALCULATED.3IONS-SCNC: 10 MMOL/L (ref 3–16)
BUN SERPL-MCNC: 8 MG/DL (ref 7–20)
CALCIUM SERPL-MCNC: 9.3 MG/DL (ref 8.3–10.6)
CHLORIDE SERPL-SCNC: 101 MMOL/L (ref 99–110)
CO2 SERPL-SCNC: 27 MMOL/L (ref 21–32)
CREAT SERPL-MCNC: 0.9 MG/DL (ref 0.9–1.3)
DEPRECATED RDW RBC AUTO: 13.2 % (ref 12.4–15.4)
GFR SERPLBLD CREATININE-BSD FMLA CKD-EPI: >90 ML/MIN/{1.73_M2}
GLUCOSE SERPL-MCNC: 92 MG/DL (ref 70–99)
HCT VFR BLD AUTO: 47.5 % (ref 40.5–52.5)
HGB BLD-MCNC: 15.8 G/DL (ref 13.5–17.5)
MCH RBC QN AUTO: 30.3 PG (ref 26–34)
MCHC RBC AUTO-ENTMCNC: 33.3 G/DL (ref 31–36)
MCV RBC AUTO: 90.9 FL (ref 80–100)
PLATELET # BLD AUTO: 360 K/UL (ref 135–450)
PMV BLD AUTO: 8.6 FL (ref 5–10.5)
POTASSIUM SERPL-SCNC: 3.9 MMOL/L (ref 3.5–5.1)
RBC # BLD AUTO: 5.22 M/UL (ref 4.2–5.9)
SODIUM SERPL-SCNC: 138 MMOL/L (ref 136–145)
WBC # BLD AUTO: 13.1 K/UL (ref 4–11)

## 2025-04-06 PROCEDURE — 80048 BASIC METABOLIC PNL TOTAL CA: CPT

## 2025-04-06 PROCEDURE — 2580000003 HC RX 258: Performed by: NEUROLOGICAL SURGERY

## 2025-04-06 PROCEDURE — 6360000002 HC RX W HCPCS: Performed by: NEUROLOGICAL SURGERY

## 2025-04-06 PROCEDURE — 99223 1ST HOSP IP/OBS HIGH 75: CPT | Performed by: INTERNAL MEDICINE

## 2025-04-06 PROCEDURE — 36415 COLL VENOUS BLD VENIPUNCTURE: CPT

## 2025-04-06 PROCEDURE — 2500000003 HC RX 250 WO HCPCS: Performed by: NEUROLOGICAL SURGERY

## 2025-04-06 PROCEDURE — 99024 POSTOP FOLLOW-UP VISIT: CPT | Performed by: NURSE PRACTITIONER

## 2025-04-06 PROCEDURE — 6370000000 HC RX 637 (ALT 250 FOR IP): Performed by: NEUROLOGICAL SURGERY

## 2025-04-06 PROCEDURE — 2580000003 HC RX 258: Performed by: INTERNAL MEDICINE

## 2025-04-06 PROCEDURE — APPNB45 APP NON BILLABLE 31-45 MINUTES: Performed by: NURSE PRACTITIONER

## 2025-04-06 PROCEDURE — 85027 COMPLETE CBC AUTOMATED: CPT

## 2025-04-06 PROCEDURE — 6360000002 HC RX W HCPCS: Performed by: INTERNAL MEDICINE

## 2025-04-06 PROCEDURE — 1200000000 HC SEMI PRIVATE

## 2025-04-06 RX ORDER — LINEZOLID 2 MG/ML
600 INJECTION, SOLUTION INTRAVENOUS EVERY 12 HOURS
Status: DISCONTINUED | OUTPATIENT
Start: 2025-04-06 | End: 2025-04-08

## 2025-04-06 RX ORDER — ACETAMINOPHEN 325 MG/1
650 TABLET ORAL EVERY 4 HOURS PRN
Status: DISCONTINUED | OUTPATIENT
Start: 2025-04-06 | End: 2025-04-08 | Stop reason: HOSPADM

## 2025-04-06 RX ADMIN — METHOCARBAMOL 750 MG: 750 TABLET ORAL at 23:44

## 2025-04-06 RX ADMIN — LINEZOLID 600 MG: 600 INJECTION, SOLUTION INTRAVENOUS at 23:50

## 2025-04-06 RX ADMIN — SODIUM CHLORIDE, PRESERVATIVE FREE 10 ML: 5 INJECTION INTRAVENOUS at 20:18

## 2025-04-06 RX ADMIN — MEROPENEM 1000 MG: 1 INJECTION INTRAVENOUS at 08:52

## 2025-04-06 RX ADMIN — METHOCARBAMOL 1000 MG: 100 INJECTION, SOLUTION INTRAMUSCULAR; INTRAVENOUS at 06:03

## 2025-04-06 RX ADMIN — OXYCODONE 5 MG: 5 TABLET ORAL at 17:11

## 2025-04-06 RX ADMIN — SENNOSIDES AND DOCUSATE SODIUM 1 TABLET: 50; 8.6 TABLET ORAL at 08:35

## 2025-04-06 RX ADMIN — SENNOSIDES AND DOCUSATE SODIUM 1 TABLET: 50; 8.6 TABLET ORAL at 20:14

## 2025-04-06 RX ADMIN — OXYCODONE 5 MG: 5 TABLET ORAL at 08:35

## 2025-04-06 RX ADMIN — MEROPENEM 1000 MG: 1 INJECTION INTRAVENOUS at 17:21

## 2025-04-06 RX ADMIN — POLYETHYLENE GLYCOL 3350 17 G: 17 POWDER, FOR SOLUTION ORAL at 08:35

## 2025-04-06 RX ADMIN — VANCOMYCIN HYDROCHLORIDE 1250 MG: 1.25 INJECTION, POWDER, LYOPHILIZED, FOR SOLUTION INTRAVENOUS at 00:49

## 2025-04-06 RX ADMIN — OXYCODONE 5 MG: 5 TABLET ORAL at 23:44

## 2025-04-06 RX ADMIN — PIPERACILLIN AND TAZOBACTAM 3375 MG: 3; .375 INJECTION, POWDER, LYOPHILIZED, FOR SOLUTION INTRAVENOUS at 02:42

## 2025-04-06 RX ADMIN — SODIUM CHLORIDE, PRESERVATIVE FREE 10 ML: 5 INJECTION INTRAVENOUS at 08:36

## 2025-04-06 RX ADMIN — METHOCARBAMOL 1000 MG: 100 INJECTION, SOLUTION INTRAMUSCULAR; INTRAVENOUS at 13:48

## 2025-04-06 RX ADMIN — HEPARIN SODIUM 5000 UNITS: 5000 INJECTION INTRAVENOUS; SUBCUTANEOUS at 20:14

## 2025-04-06 RX ADMIN — LINEZOLID 600 MG: 600 INJECTION, SOLUTION INTRAVENOUS at 11:26

## 2025-04-06 ASSESSMENT — PAIN DESCRIPTION - ORIENTATION
ORIENTATION: LEFT;RIGHT;MID
ORIENTATION: LEFT;MID;LOWER

## 2025-04-06 ASSESSMENT — PAIN DESCRIPTION - LOCATION
LOCATION: BACK
LOCATION: BACK

## 2025-04-06 ASSESSMENT — PAIN DESCRIPTION - DESCRIPTORS
DESCRIPTORS: BURNING
DESCRIPTORS: BURNING

## 2025-04-06 ASSESSMENT — PAIN SCALES - GENERAL
PAINLEVEL_OUTOF10: 6
PAINLEVEL_OUTOF10: 6
PAINLEVEL_OUTOF10: 8
PAINLEVEL_OUTOF10: 7

## 2025-04-06 NOTE — CONSULTS
Infectious Diseases Inpatient Consult Note      Reason for Consult:  Lumbar wound infection following L5-S1 laminectomy and discectomy    Requesting Physician:       Primary Care Physician:  No primary care provider on file.    History Obtained From:  Epic and pt     CHIEF COMPLAINT:     Chief Complaint   Patient presents with    Wound Check     Patient stated that he had sx 2/28 in his lower back \"for a slipped disc or something\", stated that his incision is open and he was told to come in by his surgeon.          HISTORY OF PRESENT ILLNESS:  19 y.o. male with a history of asthma admitted to hospital secondary to lumbar wound infection.  Patient underwent surgery on 2/27/25, he underwent L5-S1 microdiscectomy with central decompression at [facility] for cauda equina symptoms.  Patient now presents to hospital due to increasing drainage and noted to have wound dehiscence was seen by neurosurgery taken for incision and drainage and repeat exploration per operative report there was small pockets of seroma and fluid that was sent for culture the dura was poorly encountered in the location of previous laminectomy defect no purulence noted.  Operative culture in process we are consulted for recommendations labs indicate creatinine 0.8 WBC normal CRP less than 3 ESR less than 1          Past Medical History:    Past Medical History:   Diagnosis Date    Asthma     High risk sexual behavior        Past Surgical History:    Past Surgical History:   Procedure Laterality Date    LUMBAR SPINE SURGERY N/A 2/27/2025    LUMBAR 5 - SACRAL 1 CENTRAL LAMINECTOMY AND DISCECTOMY performed by Abdifatah Caban MD at [facility] OR       Current Medications:    Outpatient Medications Marked as Taking for the 4/4/25 encounter (Hospital Encounter)   Medication Sig Dispense Refill    melatonin 3 MG TABS tablet Take 10 mg by mouth nightly as needed (sleep)         Allergies:  Grass pollen(k-o-r-t-swt georgi) and Shrimp 
      Neurosurgery Consult:    Patient Name: Cori Rojas YOB: 2005   Sex: Male Age: 19 yrs     Medical Record Number: [id number] Acct Number: [id number]   Room Number: 5304/5304-01 Hospital Day: Hospital Day: 2     Requesting physician: Liban Barreto MD    Reason for consultation: Wound dehiscence    History of present illness: Mr. Rojas is a 19 year old man who underwent an L5-S1 microdiscectomy with central decompression on 2/27/2025 after presenting with symptoms of cauda equina.  He was previously scheduled for appointments on 3/10/2025 and 3/25/2025 which he missed.  He presented to the office on Monday, 3/31/2025 with reports of mild incisional pain as well as episodic paraspinal muscle spasms.  Notably, he was able to recover following the operation such that he was ambulating without assistance and no longer experienced any radicular discomfort. He does have some residual perineal numbness. Unfortunately, he reported in the office that upon discharge from the hospital he removed both the surgical glue and the subcuticular suture that formed his surgical dressing because he thought they felt like \"a scab.\" Since that time, he noted his wound has been opened and correspondingly has had some minimal drainage.  He denies any associated fevers but notes that the wound has failed to close on its own.  He was asked to come to the emergency room on Monday and failed to do so.  Subsequently, he noted he would come on Tuesday and also failed to present to the emergency department.  Several calls were made to him on Wednesday and Thursday without response.  On Friday, he presented for evaluation and likely washout.  He denies any progression of his symptoms.      ROS:   Denies fever or recent illness.   Denies chest pain  Denies shortness of breath  Denies changes in bowel or bladder function    VITAL SIGNS   /62   Pulse 50   Temp 97.6 °F (36.4 °C) (Oral)   Resp 16   Ht 1.905 m (6' 3\")   Wt 
Clinical Pharmacy Progress Note    Vancomycin has been discontinued. Will sign off pharmacy to dose Vancomycin consult.  If medication is restarted and pharmacy is to manage dosing, please re-consult at that time.    Please call with questions--  Thanks--  Chanel ZavalaD, BCPS, [facility]  [id number] ([facility])   4/6/2025 8:38 AM      
mg/dL (L)).    Additional Lab Values / Findings of Note:    No results for input(s): \"PROCAL\" in the last 72 hours.    
yo male who presents with wound dehiscence and drainage after L5-S1 discectomy with Dr. Caban on 2/27       Plan:  Tentative plan for OR tmw for wound exploration/washout and closure with Dr. Caban  MRI lumbar spine w wo contrast   Send CBC, BMP, sed rate and CRP  Hold on antibiotics at this time  Hold any anticoagulation/DVT chemoprophylaxis until surgical timing determined   Keep NPO @ 0000  Activity as tolerated  Dry gauze dressing to incision, change q shift and prn for drainage  Thank you for consult, will follow. Please call with questions             DISPO-hospitalist to admit, possible OR tomorrow    ASHUTOSH Gonzalez-CNP  Neurosurgery Nurse Practitioner  [phone]    Patient was discussed with Dr. Caban who agrees with above assessment and plan.     Electronically signed by: ASHUTOSH Duron NP, 4/4/2025 10:20 PM

## 2025-04-07 LAB
HAV IGM SERPL QL IA: NORMAL
HBV CORE IGM SERPL QL IA: NORMAL
HBV SURFACE AG SERPL QL IA: NORMAL
HCV AB SERPL QL IA: NORMAL
LOEFFLER MB STN SPEC: NORMAL

## 2025-04-07 PROCEDURE — 86702 HIV-2 ANTIBODY: CPT

## 2025-04-07 PROCEDURE — 6360000002 HC RX W HCPCS: Performed by: NEUROLOGICAL SURGERY

## 2025-04-07 PROCEDURE — 97165 OT EVAL LOW COMPLEX 30 MIN: CPT

## 2025-04-07 PROCEDURE — 99233 SBSQ HOSP IP/OBS HIGH 50: CPT | Performed by: INTERNAL MEDICINE

## 2025-04-07 PROCEDURE — 97530 THERAPEUTIC ACTIVITIES: CPT

## 2025-04-07 PROCEDURE — 87390 HIV-1 AG IA: CPT

## 2025-04-07 PROCEDURE — 1200000000 HC SEMI PRIVATE

## 2025-04-07 PROCEDURE — 6370000000 HC RX 637 (ALT 250 FOR IP): Performed by: NEUROLOGICAL SURGERY

## 2025-04-07 PROCEDURE — 6360000002 HC RX W HCPCS: Performed by: INTERNAL MEDICINE

## 2025-04-07 PROCEDURE — APPNB45 APP NON BILLABLE 31-45 MINUTES: Performed by: NURSE PRACTITIONER

## 2025-04-07 PROCEDURE — 97116 GAIT TRAINING THERAPY: CPT

## 2025-04-07 PROCEDURE — 97161 PT EVAL LOW COMPLEX 20 MIN: CPT

## 2025-04-07 PROCEDURE — 2500000003 HC RX 250 WO HCPCS: Performed by: NEUROLOGICAL SURGERY

## 2025-04-07 PROCEDURE — 86701 HIV-1ANTIBODY: CPT

## 2025-04-07 PROCEDURE — 36415 COLL VENOUS BLD VENIPUNCTURE: CPT

## 2025-04-07 PROCEDURE — 99024 POSTOP FOLLOW-UP VISIT: CPT | Performed by: NURSE PRACTITIONER

## 2025-04-07 PROCEDURE — 2580000003 HC RX 258: Performed by: INTERNAL MEDICINE

## 2025-04-07 PROCEDURE — 80074 ACUTE HEPATITIS PANEL: CPT

## 2025-04-07 RX ORDER — SODIUM CHLORIDE 0.9 % (FLUSH) 0.9 %
5-40 SYRINGE (ML) INJECTION EVERY 12 HOURS SCHEDULED
Status: DISCONTINUED | OUTPATIENT
Start: 2025-04-07 | End: 2025-04-08 | Stop reason: HOSPADM

## 2025-04-07 RX ORDER — LIDOCAINE HYDROCHLORIDE 10 MG/ML
50 INJECTION, SOLUTION EPIDURAL; INFILTRATION; INTRACAUDAL; PERINEURAL ONCE
Status: COMPLETED | OUTPATIENT
Start: 2025-04-07 | End: 2025-04-08

## 2025-04-07 RX ORDER — SODIUM CHLORIDE 9 MG/ML
INJECTION, SOLUTION INTRAVENOUS PRN
Status: DISCONTINUED | OUTPATIENT
Start: 2025-04-07 | End: 2025-04-08 | Stop reason: HOSPADM

## 2025-04-07 RX ORDER — SODIUM CHLORIDE 0.9 % (FLUSH) 0.9 %
5-40 SYRINGE (ML) INJECTION PRN
Status: DISCONTINUED | OUTPATIENT
Start: 2025-04-07 | End: 2025-04-08 | Stop reason: HOSPADM

## 2025-04-07 RX ADMIN — OXYCODONE 5 MG: 5 TABLET ORAL at 08:11

## 2025-04-07 RX ADMIN — OXYCODONE 5 MG: 5 TABLET ORAL at 13:44

## 2025-04-07 RX ADMIN — OXYCODONE 10 MG: 5 TABLET ORAL at 19:07

## 2025-04-07 RX ADMIN — SODIUM CHLORIDE, PRESERVATIVE FREE 10 ML: 5 INJECTION INTRAVENOUS at 19:58

## 2025-04-07 RX ADMIN — POLYETHYLENE GLYCOL 3350 17 G: 17 POWDER, FOR SOLUTION ORAL at 08:17

## 2025-04-07 RX ADMIN — HEPARIN SODIUM 5000 UNITS: 5000 INJECTION INTRAVENOUS; SUBCUTANEOUS at 15:24

## 2025-04-07 RX ADMIN — MEROPENEM 1000 MG: 1 INJECTION INTRAVENOUS at 01:36

## 2025-04-07 RX ADMIN — LINEZOLID 600 MG: 600 INJECTION, SOLUTION INTRAVENOUS at 23:10

## 2025-04-07 RX ADMIN — HEPARIN SODIUM 5000 UNITS: 5000 INJECTION INTRAVENOUS; SUBCUTANEOUS at 05:28

## 2025-04-07 RX ADMIN — SENNOSIDES AND DOCUSATE SODIUM 1 TABLET: 50; 8.6 TABLET ORAL at 19:58

## 2025-04-07 RX ADMIN — LINEZOLID 600 MG: 600 INJECTION, SOLUTION INTRAVENOUS at 12:12

## 2025-04-07 RX ADMIN — SENNOSIDES AND DOCUSATE SODIUM 1 TABLET: 50; 8.6 TABLET ORAL at 08:17

## 2025-04-07 RX ADMIN — HEPARIN SODIUM 5000 UNITS: 5000 INJECTION INTRAVENOUS; SUBCUTANEOUS at 23:08

## 2025-04-07 RX ADMIN — MEROPENEM 1000 MG: 1 INJECTION INTRAVENOUS at 19:52

## 2025-04-07 ASSESSMENT — PAIN DESCRIPTION - LOCATION
LOCATION: BACK
LOCATION: BUTTOCKS
LOCATION: BACK

## 2025-04-07 ASSESSMENT — PAIN - FUNCTIONAL ASSESSMENT
PAIN_FUNCTIONAL_ASSESSMENT: ACTIVITIES ARE NOT PREVENTED

## 2025-04-07 ASSESSMENT — PAIN SCALES - GENERAL
PAINLEVEL_OUTOF10: 6
PAINLEVEL_OUTOF10: 8
PAINLEVEL_OUTOF10: 6

## 2025-04-07 ASSESSMENT — PAIN DESCRIPTION - ORIENTATION
ORIENTATION: MID

## 2025-04-07 ASSESSMENT — PAIN DESCRIPTION - DESCRIPTORS
DESCRIPTORS: ACHING
DESCRIPTORS: DISCOMFORT
DESCRIPTORS: ACHING

## 2025-04-07 NOTE — DISCHARGE INSTR - COC
5/6/25  - Diagnosis   - First dose given in hospital  - Routine CVC care   - Labs: CBC w diff, CMP, ESR, CRP, CK every Mon or Tue, FAX results to [phone]  - Call with antibiotic / infusion issues, [phone]  - Call with any change in status, transfer in or out of a facility or to hospital - [phone]  - Orders only valid for current disposition, NOT transferable upon discharge from facility or new admission to another facility.  - No f/u in outpatient ID office necessary    Robbi Joe MD         Physician Certification: I certify the above information and transfer of Cori Rojas  is necessary for the continuing treatment of the diagnosis listed and that he requires {Admit to Appropriate Level of Care:79971} for {GREATER/LESS:[id number]} 30 days.     Update Admission H&P: {CHP DME Changes in HandP:[id number]}    PHYSICIAN SIGNATURE:  {Esignature:[id number]}

## 2025-04-07 NOTE — CARE COORDINATION
Case Management Assessment  Initial Evaluation    Date/Time of Evaluation: 4/7/2025 9:31 AM  Assessment Completed by: Greta Calle RN    If patient is discharged prior to next notation, then this note serves as note for discharge by case management.    Patient Name: Cori Rojas                   YOB: 2005  Diagnosis: Postoperative wound infection [T81.49XA]  Wound infection [T14.8XXA, L08.9]                   Date / Time: 4/4/2025  3:47 PM    Patient Admission Status: Inpatient   Readmission Risk (Low < 19, Mod (19-27), High > 27): Readmission Risk Score: 10.9    Current PCP: No primary care provider on file.  PCP verified by CM? No (Does not have one)    Chart Reviewed: Yes      History Provided by: Patient  Patient Orientation: Alert and Oriented    Patient Cognition: Alert    Hospitalization in the last 30 days (Readmission):  No    If yes, Readmission Assessment in CM Navigator will be completed.    Advance Directives:      Code Status: Full Code   Patient's Primary Decision Maker is: Legal Next of Kin      Discharge Planning:    Patient lives with: Parent Type of Home: House  Primary Care Giver: Self  Patient Support Systems include: Parent   Current Financial resources: Medicaid  Current community resources: None  Current services prior to admission: None            Current DME:              Type of Home Care services:  None    ADLS  Prior functional level: Independent in ADLs/IADLs  Current functional level: Independent in ADLs/IADLs    PT AM-PAC:   /24  OT AM-PAC:   /24    Family can provide assistance at DC: Yes  Would you like Case Management to discuss the discharge plan with any other family members/significant others, and if so, who? No  Plans to Return to Present Housing: Yes  Other Identified Issues/Barriers to RETURNING to current housing: n/a  Potential Assistance needed at discharge: Home Care            Potential DME:    Patient expects to discharge to: [place]  Plan for

## 2025-04-08 ENCOUNTER — APPOINTMENT (OUTPATIENT)
Dept: GENERAL RADIOLOGY | Age: 20
DRG: 711 | End: 2025-04-08
Attending: INTERNAL MEDICINE
Payer: COMMERCIAL

## 2025-04-08 VITALS
DIASTOLIC BLOOD PRESSURE: 76 MMHG | BODY MASS INDEX: 24.25 KG/M2 | HEART RATE: 63 BPM | RESPIRATION RATE: 16 BRPM | OXYGEN SATURATION: 99 % | HEIGHT: 75 IN | WEIGHT: 195 LBS | TEMPERATURE: 98.4 F | SYSTOLIC BLOOD PRESSURE: 128 MMHG

## 2025-04-08 LAB
BASOPHILS # BLD: 0.1 K/UL (ref 0–0.2)
BASOPHILS NFR BLD: 1 %
DEPRECATED RDW RBC AUTO: 13.2 % (ref 12.4–15.4)
EOSINOPHIL # BLD: 0.3 K/UL (ref 0–0.6)
EOSINOPHIL NFR BLD: 4.3 %
HCT VFR BLD AUTO: 47.6 % (ref 40.5–52.5)
HGB BLD-MCNC: 16 G/DL (ref 13.5–17.5)
HIV 1+2 AB+HIV1 P24 AG SERPL QL IA: NORMAL
HIV 2 AB SERPL QL IA: NORMAL
HIV1 AB SERPL QL IA: NORMAL
HIV1 P24 AG SERPL QL IA: NORMAL
LYMPHOCYTES # BLD: 2.6 K/UL (ref 1–5.1)
LYMPHOCYTES NFR BLD: 39.5 %
MCH RBC QN AUTO: 30.3 PG (ref 26–34)
MCHC RBC AUTO-ENTMCNC: 33.5 G/DL (ref 31–36)
MCV RBC AUTO: 90.4 FL (ref 80–100)
MONOCYTES # BLD: 0.8 K/UL (ref 0–1.3)
MONOCYTES NFR BLD: 11.5 %
NEUTROPHILS # BLD: 2.9 K/UL (ref 1.7–7.7)
NEUTROPHILS NFR BLD: 43.7 %
PLATELET # BLD AUTO: 334 K/UL (ref 135–450)
PMV BLD AUTO: 8.8 FL (ref 5–10.5)
RBC # BLD AUTO: 5.27 M/UL (ref 4.2–5.9)
WBC # BLD AUTO: 6.6 K/UL (ref 4–11)

## 2025-04-08 PROCEDURE — 99232 SBSQ HOSP IP/OBS MODERATE 35: CPT | Performed by: INTERNAL MEDICINE

## 2025-04-08 PROCEDURE — 2580000003 HC RX 258: Performed by: INTERNAL MEDICINE

## 2025-04-08 PROCEDURE — 36569 INSJ PICC 5 YR+ W/O IMAGING: CPT

## 2025-04-08 PROCEDURE — C1751 CATH, INF, PER/CENT/MIDLINE: HCPCS

## 2025-04-08 PROCEDURE — 05HY33Z INSERTION OF INFUSION DEVICE INTO UPPER VEIN, PERCUTANEOUS APPROACH: ICD-10-PCS | Performed by: INTERNAL MEDICINE

## 2025-04-08 PROCEDURE — 71045 X-RAY EXAM CHEST 1 VIEW: CPT

## 2025-04-08 PROCEDURE — 6360000002 HC RX W HCPCS: Performed by: INTERNAL MEDICINE

## 2025-04-08 PROCEDURE — 99024 POSTOP FOLLOW-UP VISIT: CPT | Performed by: NURSE PRACTITIONER

## 2025-04-08 PROCEDURE — 85025 COMPLETE CBC W/AUTO DIFF WBC: CPT

## 2025-04-08 PROCEDURE — 6370000000 HC RX 637 (ALT 250 FOR IP): Performed by: NEUROLOGICAL SURGERY

## 2025-04-08 PROCEDURE — 2500000003 HC RX 250 WO HCPCS: Performed by: INTERNAL MEDICINE

## 2025-04-08 PROCEDURE — 36415 COLL VENOUS BLD VENIPUNCTURE: CPT

## 2025-04-08 PROCEDURE — APPNB45 APP NON BILLABLE 31-45 MINUTES: Performed by: NURSE PRACTITIONER

## 2025-04-08 PROCEDURE — 6360000002 HC RX W HCPCS: Performed by: NEUROLOGICAL SURGERY

## 2025-04-08 RX ORDER — POLYETHYLENE GLYCOL 3350 17 G/17G
17 POWDER, FOR SOLUTION ORAL DAILY
Qty: 10 PACKET | Refills: 0 | Status: SHIPPED | OUTPATIENT
Start: 2025-04-09 | End: 2025-04-19

## 2025-04-08 RX ORDER — OXYCODONE HYDROCHLORIDE 5 MG/1
5 TABLET ORAL EVERY 6 HOURS PRN
Qty: 28 TABLET | Refills: 0 | Status: SHIPPED | OUTPATIENT
Start: 2025-04-08 | End: 2025-04-15

## 2025-04-08 RX ADMIN — OXYCODONE 10 MG: 5 TABLET ORAL at 12:30

## 2025-04-08 RX ADMIN — OXYCODONE 10 MG: 5 TABLET ORAL at 00:41

## 2025-04-08 RX ADMIN — SENNOSIDES AND DOCUSATE SODIUM 1 TABLET: 50; 8.6 TABLET ORAL at 07:51

## 2025-04-08 RX ADMIN — HEPARIN SODIUM 5000 UNITS: 5000 INJECTION INTRAVENOUS; SUBCUTANEOUS at 05:35

## 2025-04-08 RX ADMIN — POLYETHYLENE GLYCOL 3350 17 G: 17 POWDER, FOR SOLUTION ORAL at 07:51

## 2025-04-08 RX ADMIN — LIDOCAINE HYDROCHLORIDE ANHYDROUS 50 MG: 10 INJECTION, SOLUTION INFILTRATION at 14:36

## 2025-04-08 RX ADMIN — OXYCODONE 10 MG: 5 TABLET ORAL at 07:51

## 2025-04-08 RX ADMIN — MEROPENEM 1000 MG: 1 INJECTION INTRAVENOUS at 05:36

## 2025-04-08 RX ADMIN — DAPTOMYCIN 550 MG: 500 INJECTION, POWDER, LYOPHILIZED, FOR SOLUTION INTRAVENOUS at 12:59

## 2025-04-08 RX ADMIN — ERTAPENEM SODIUM 1000 MG: 1 INJECTION INTRAMUSCULAR; INTRAVENOUS at 11:07

## 2025-04-08 ASSESSMENT — PAIN SCALES - GENERAL
PAINLEVEL_OUTOF10: 7
PAINLEVEL_OUTOF10: 9
PAINLEVEL_OUTOF10: 9
PAINLEVEL_OUTOF10: 8
PAINLEVEL_OUTOF10: 9
PAINLEVEL_OUTOF10: 4
PAINLEVEL_OUTOF10: 4
PAINLEVEL_OUTOF10: 5

## 2025-04-08 ASSESSMENT — PAIN DESCRIPTION - ONSET: ONSET: ON-GOING

## 2025-04-08 ASSESSMENT — PAIN DESCRIPTION - ORIENTATION
ORIENTATION: MID

## 2025-04-08 ASSESSMENT — PAIN DESCRIPTION - FREQUENCY: FREQUENCY: CONTINUOUS

## 2025-04-08 ASSESSMENT — PAIN DESCRIPTION - DESCRIPTORS
DESCRIPTORS: ACHING

## 2025-04-08 ASSESSMENT — PAIN - FUNCTIONAL ASSESSMENT
PAIN_FUNCTIONAL_ASSESSMENT: ACTIVITIES ARE NOT PREVENTED

## 2025-04-08 ASSESSMENT — PAIN DESCRIPTION - LOCATION
LOCATION: BACK

## 2025-04-08 ASSESSMENT — PAIN DESCRIPTION - PAIN TYPE
TYPE: SURGICAL PAIN
TYPE: SURGICAL PAIN

## 2025-04-08 NOTE — PLAN OF CARE
Problem: Discharge Planning  Goal: Discharge to home or other facility with appropriate resources  4/7/2025 2006 by Andie Kelsey, RN  Outcome: Progressing     Problem: Pain  Goal: Verbalizes/displays adequate comfort level or baseline comfort level  4/7/2025 2006 by Andie Kelsey, RN  Outcome: Progressing

## 2025-04-08 NOTE — DISCHARGE SUMMARY
Cultures:   Lab Results   Component Value Date/Time    LABURIN No growth at 18 to 36 hours 02/26/2025 01:54 AM     Blood Cultures: No results found for: \"BC\"  No results found for: \"BLOODCULT2\"  Organism:   Lab Results   Component Value Date/Time    ORG Escherichia coli ESBL 12/14/2024 09:26 AM       Time Spent Discharging patient > 31 minutes    Electronically signed by ASHUTOSH Fernandes CNP on 4/8/2025 at 2:08 PM

## 2025-04-08 NOTE — PROCEDURES
PROCEDURE NOTE  Date: 2025   Name: Cori Rojas  YOB: 2005    Procedures                                                         PICC PROCEDURE NOTE WITH PCXR NEEDED  Chart reviewed for allergies, diagnosis, labs, known contraindications, reason for line placement and planned length of treatment.  Informed consent noted to be signed and on chart.  Insertion procedure discussed with patient/family member.  Three patient identifiers - Patient name,   and MRN -  completed &  confirmed verbally.         Time out performed Hat, mask and eye shield donned.  PICC site scrubbed with Chloraprep  One-Step applicator for 30 seconds x 1.   Hand Hygiene  performed with 3% Chlorhexidine surgical scrub x1 min prior to  Sterile gloves, sterile gown being donned.  Patient draped using maximal sterile barrier technique ( head to toe ).  PICC site scrubbed a 2nd time with Chloraprep One-Step applicator x 30 sec. Modified Seldinger  technique/ultrasound assisted and tip locating system utilized for insertion. 1% Lidocaine 5 ml injected interdermally pre-insertion.  PCXR obtained d/t unable to confirm PICC tip with 3CG, will place note and order with clarification of radiologist reading for clearance to use line when available and notify RN.  Positive brisk blood return obtained from all lumens.  Valves applied and each lumen flushed with 10 mls  0.9% Sterile Sodium Chloride.  All lumens flush easily with no resistance.  Catheter secured with non-sutured locking device per hospital protocol.  Bio-patch/CHG impregnated sterile tegaderm dressing applied.   Alcohol Swab Caps applied to all valves.  Sterile field maintained during procedure.  Guide wire and positioning wire accounted for post procedure and disposed of in sharps.  Appearance of site is Clean dry and intact without bleeding or edema. All edges of Tegaderm occlusive.   Site marked with date and initials of RN placing line. Teaching performed to pt/family

## 2025-04-08 NOTE — CARE COORDINATION
Case Management Assessment            Discharge Note                    Date / Time of Note: 4/8/2025 3:34 PM                  Discharge Note Completed by: Greta Calle RN    Patient Name: Cori Rojas   YOB: 2005  Diagnosis: Postoperative wound infection [T81.49XA]  Wound infection [T14.8XXA, L08.9]   Date / Time: 4/4/2025  3:47 PM    Current PCP: No primary care provider on file.  Clinic patient: No    Hospitalization in the last 30 days: No       Advance Directives:  Code Status: Full Code  [place] DNR form completed and on chart: No    Financial:  Payor: [place] / Plan: [place] / Product Type: *No Product type* /      Pharmacy:    [facility] [address] [place] [address] - P [phone] - F [phone]  [address] [place] [id number]  Phone: [phone] Fax: [phone]      Assistance purchasing medications?: Potential Assistance Purchasing Medications: No  Assistance provided by Case Management: None at this time    Does patient want to participate in local refill/ meds to beds program?: Yes    Meds To Beds General Rules:  1. Can ONLY be done Monday- Friday between 8:30am-5pm  2. Prescription(s) must be in pharmacy by 3pm to be filled same day  3.Copy of patient's insurance/ prescription drug card and patient face sheet must be sent along with the prescription(s)  4. Cost of Rx cannot be added to hospital bill. If financial assistance is needed, please contact unit  or ;  or  CANNOT provide pharmacy voucher for patients co-pays  5. Patients can then  the prescription on their way out of the hospital at discharge, or pharmacy can deliver to the bedside if staff is available. (payment due at time of pick-up or delivery - cash, check, or card accepted)     Able to afford home medications/ co-pay costs: Yes    ADLS:  Current PT AM-PAC Score: 24 /24  Current OT AM-PAC Score:

## 2025-04-08 NOTE — DISCHARGE INSTRUCTIONS
[facility] Program Survey  The [facility] values your feedback related to your recent hospital visit and admission. We strive to improve our program to promote better outcomes and recoveries for all our patients.  The survey code below consists of a few questions that are related to your stay and around your Spine diagnosis, treatment, and recovery. The estimated length of time needed to complete this survey is 4 minutes or less. Thank you for completing this survey!

## 2025-04-08 NOTE — PROGRESS NOTES
NEUROSURGERY POST-OP PROGRESS NOTE    Patient Name: Cori Rojas YOB: 2005   Sex: Male Age: 19 yrs     Medical Record Number: [id number] Acct Number: [id number]   Room Number: 5304/5304-01 Hospital Day: Hospital Day: 3     Interval History:  Post-operative Day#2 s/p Procedure(s) (LRB):  EXPLORATION AND WASHOUT OF LUMBAR WOUND AND PLACEMENT OF WOUND VAC (N/A)    Subjective: Pt having some incisional pain. He can't believe he has to have the prevena wound vac on for 7 days.     Objective:    VITAL SIGNS   BP (!) 114/59   Pulse 58   Temp 97.7 °F (36.5 °C) (Oral)   Resp 18   Ht 1.905 m (6' 3\")   Wt 88.5 kg (195 lb)   SpO2 95%   BMI 24.37 kg/m²    Height Height: 190.5 cm (6' 3\")   Weight Weight - Scale: 88.5 kg (195 lb)        Allergies Allergies   Allergen Reactions    Grass Pollen(K-O-R-T-Swt Neel)     Shrimp Extract Hives      Diet ADULT DIET; Regular   Isolation Contact     LABS   Basic Metabolic Profile Recent Labs     04/04/25  1715 04/05/25  0732    140   K 4.0 3.8    101   CO2 26 29   BUN 10 8   CREATININE 0.8* 0.8*   GLUCOSE 101* 91   CALCIUM 9.0 9.5      Complete Blood Count Recent Labs     04/04/25  1715 04/05/25  0732   WBC 10.0 7.7   RBC 5.30 5.09   HGB 16.0 15.6   HCT 47.1 46.4    359      Coagulation Studies Recent Labs     04/04/25 1715 04/05/25  0732   PROTIME 12.8 13.8   INR 0.95 1.04        MEDICATIONS   Inpatient Medications     piperacillin-tazobactam, 3,375 mg, IntraVENous, Q8H    vancomycin, 1,250 mg, IntraVENous, Q8H    sodium chloride flush, 5-40 mL, IntraVENous, 2 times per day    polyethylene glycol, 17 g, Oral, Daily    sennosides-docusate sodium, 1 tablet, Oral, BID    methocarbamol (ROBAXIN) IVPB, 1,000 mg, IntraVENous, Q8H **FOLLOWED BY** methocarbamol, 750 mg, Oral, Q6H PRN    heparin 
      Hospitalist Progress Note      Name:  Cori Rojas /Age/Sex: 2005  (19 y.o. male)   MRN & CSN:  [id number] & [id number] Encounter Date/Time: 2025 12:06 PM EDT    Location:  [facility] PCP: No primary care provider on file.       Hospital Day: 4         Date of Admission: 2025    Chief Complaint: Post Op Wound Infection     Hospital Course: 19 y.o. male who presented to [facility] with postop wound infection.  PMHx significant for recent L5-S1 laminectomy discectomy on , asthma. Patient had surgery on  and he noted that 3 to 4 days after surgery, he started noticing discharge coming out of his wound site.  He did not think anything of it and did not say anything.  He followed up with his surgeon and was instructed to come to the emergency department for further evaluation and treatment.  He denied having any pain in his lower back and just said that it was just discharge coming out of there.  Denies any fevers, chills.  No other acute complaints. In the ED, vital signs stable.  IV Vanc and Zosyn given in ED; afebrile w/o infectious process noted. MRI of the lumbar spine was ordered and pending at this time.  Neurosurgery was consulted in the ED; () exploration and washout of lumbar wound and placement of wound vac. Per Neurosurgery, although the patient had no fevers or evidence of infection systemically, recommend consultation of ID and empiric antibiotic therapy given the dehiscence of the wound. ID consulted. Discontinued IV Vanc and Zosyn (); IV meropenem 1 Gram Q 8 HRS, IV Linezolid 600 MG Q 12 Hr.     Per EMR review: Patient initially presented on 2025 to [facility] with chief complaint of back pain and radiculopathy; patient was reportedly playing basketball 3 days prior and felt like \"pinched nerve\".  Patient reported pain started while he sat down.  It got progressively worsened and started radiating to the right leg worse with movement.  He also 
4 Eyes Skin Assessment     NAME:  Cori Rojas  YOB: 2005  MEDICAL RECORD NUMBER:  [id number]    The patient is being assessed for  Admission    I agree that at least one RN has performed a thorough Head to Toe Skin Assessment on the patient. ALL assessment sites listed below have been assessed.      Areas assessed by both nurses:    Head, Face, Ears, Shoulders, Back, Chest, Arms, Elbows, Hands, Sacrum. Buttock, Coccyx, Ischium, Legs. Feet and Heels, and Under Medical Devices         Dressing on the wound of lower back dci.     Does the Patient have a Wound? Yes wound(s) were present on assessment. LDA wound assessment was Initiated and completed by RN       Heraclio Prevention initiated by RN: No  Wound Care Orders initiated by RN: No    Pressure Injury (Stage 3,4, Unstageable, DTI, NWPT, and Complex wounds) if present, place Wound referral order by RN under : No    New Ostomies, if present place, Ostomy referral order under : No     Nurse 1 eSignature: Electronically signed by FERNANDO ROSADO RN on 4/5/25 at 2:32 AM EDT    **SHARE this note so that the co-signing nurse can place an eSignature**    Nurse 2 eSignature: Electronically signed by Sanna Olivas RN on 4/5/25 at 3:09 AM EDT     Patient transferred to room 5304 from ED. Patient is A&O x 4. VSS. Patient oriented to the room all safety measures in place. Patient given IS and SCDs at this time. Admission orders released and patient 4 eyes completed. Admission documentation completed. No other needs are noted at this time.    [x] Bed alarm on and cord plugged into wall  [x] Bed in lowest position  [x] Call light and bedside table within reach  [x] Patient educated on all safety measures  []Oxygen connected to wall (if applicable)     Nurse 1 Esignature: Electronically signed by FERNANDO ROSADO RN on 4/5/25 at 2:33 AM EDT  Nurse 2 Esignature: Electronically signed by Sanna Olivas RN on 4/5/25 at 3:09 AM EDT   
From OR to PACU.   Post op Procedures  EXPLORATION AND WASHOUT OF LUMBAR WOUND AND PLACEMENT OF WOUND VAC  Abdifatah Caban MD  In PACU, OR 06  Report received from CRNA at bedside.   Patient drowsy post op- no signs of discomfort noted.   Lower back with incision horizontal under foam dressing - above incision is Hemovac compressed with scant bloody drainage.   Left lower back with wound vac prevena black foam dressing/tegaderm intact.   Good peripheral pulses.   Patient moving all extremities without difficulty.   
Order noted for PICC, d/w Dr Joe and Rula RN, pt not leaving today will place line 4/8.  
PACU Transfer Note    Vitals:    04/05/25 1815   BP: (!) 94/40   Pulse: 91   Resp: 18   Temp: 98.5 °F (36.9 °C)   SpO2:        In: 2000 [I.V.:2000]  Out: 25     Pain assessment:  none 3/10 tolerable. Report given to Jacey RN 5 south at bedside. Oral temp 98.4. Patient to room 5304 as scheduled.   Pain Level: 5    Report given to Receiving unit RN.    4/5/2025 6:40 PM       
Patient alert and oriented x4. Patient denies any pain.  Dressing of lower back wound dci. NPO after midnight. Assessment done.see flowsheet. Patient in bed lowest position call light and bedside table within reach. All needs are met at this time. Patient aware to call if any help needed.Plan of care ongoing  /62   Pulse 50   Temp 97.6 °F (36.4 °C) (Oral)   Resp 16   Ht 1.905 m (6' 3\")   Wt 88.5 kg (195 lb)   SpO2 98%   BMI 24.37 kg/m²     
Patient says he takes Melatonin 10mg PRN at home for sleep. Jovany-Marker NP aware.  
Pt alert and oriented. VSS. Pt surgical site CDI with wound vac in place with good seal. Pt voiding freely. Pt tolerating diet. Pt denies pain thus far. Pt receiving intermittent ABX, see MAR. Pt has call light within reach, bed in lowest position with wheels locked, 2/4 side rails up, and bed alarm on.  
VS stable. No complaints of pain or nausea.   Called for 5 south RN to come to bedside for report  
VSS, A&O, pain medication was given for comfort. Pt voiding using the urinal, pt ambulated. Surgical site CDI, WV in place. Call light used for needs, questions were answered, and needs were met during this shift.   
VSS, Pain managed with medication with benefit. Pt used the urinal for voiding. IV abx infused, pt tolerating diet. Wound Vac and drain intact and compressed. Call light was used for needs, fall precaution in place, bed alarm on. Questions were answered and needs were met during this shift.   
chloride        Antibiotics   Recent Abx Admin                     meropenem (MERREM) 1,000 mg in sodium chloride 0.9 % 100 mL IVPB (addEASE) (mg) 1,000 mg New Bag 04/07/25 0831     1,000 mg New Bag  0136     1,000 mg New Bag 04/06/25 1721    linezolid (ZYVOX) IVPB 600 mg (mg) 600 mg New Bag 04/06/25 2350     600 mg New Bag  1126                     Neurologic Exam:  Mental status: awake and alert and oriented x4    Musculoskeletal:   Gait: Not tested   Tone: normal  Sensory: intact to all extremities  Motor strength:    Right  Left    Right  Left    Deltoid  5 5  Hip Flex  5 5   Biceps  5 5  Knee Extensors  5 5   Triceps  5 5  Knee Flexors  5 5   Wrist Ext  5 5  Ankle Dorsiflex.  5 5   Wrist Flex  5 5  Ankle Plantarflex.  5 5   Handgrip  5 5  Ext Rickey Longus  5 5   Thumb Ext  5 5         Incision: Prevena Wound vac in place  Drain: 0mL    Respiratory:  Unlabored respiratory pattern    Abdomen:   Soft, ND   Last BM pre op    Cardiovascular:  Warm, well perfused    Assessment   Patient is a 18 yo M POD 2 s/p EXPLORATION AND WASHOUT OF LUMBAR WOUND AND PLACEMENT OF WOUND VAC per Dr. Caban. NGTD on cultures     Plan:  Neurologic exam frequency:q4  Mobility:PT/OT eval  Diet:ADAT  Antibiotics: Per ID recommendations   Drain: remove hemovac  Prevena Wound vac in place  DVT Prophylaxis: SCDs and heparin sq  Bowel Regimen: senna and glycolax   Pain control: tylenol, roxicodone   Spasms: robaxin   Ice to incision area q2  Incisional Care: prevena wound vac  Dispo Planning: waiting on culture/final antibiotic plan from infectious disease prior to discharge     Patient was seen with Dr. Caban who agrees with above assessment and plan.     Electronically signed by: ASHUTOSH Duron NP, 4/7/2025 11:02 AM   Neurosurgery Nurse Practitioner  [phone]   
Cori Rojas on 04/08/25. I agree with the assessment and plan as detailed above.     Abdifatah Caban MD, PhD  [address], [address]  [place], [place], [address] [phone] (c), [phone] (o)     
Independent (increased time to come to full stand)  Stand to Sit: Independent  Ambulation  Device: No Device  Assistance: Independent  Quality of Gait: steady gt, no LOB  Distance: 500'  Stairs/Curb  Stairs?:  (voices no concerns about ability to navigate stairs.  Declined need to practice)        Exercise Treatment: Educated on transversus abdominis and glut activation exercises.  Pt verbalized understanding       OutComes Score                                                  AM-PAC - Mobility    AM-PAC Basic Mobility - Inpatient   How much help is needed turning from your back to your side while in a flat bed without using bedrails?: None  How much help is needed moving from lying on your back to sitting on the side of a flat bed without using bedrails?: None  How much help is needed moving to and from a bed to a chair?: None  How much help is needed standing up from a chair using your arms?: None  How much help is needed walking in hospital room?: None  How much help is needed climbing 3-5 steps with a railing?: None  AM-PAC Inpatient Mobility Raw Score : 24  AM-PAC Inpatient T-Scale Score : 61.14  Mobility Inpatient CMS 0-100% Score: 0  Mobility Inpatient CMS G-Code Modifier : CH         Tinneti Score       Goals  Short Term Goals  Time Frame for Short Term Goals: No goals set - pt independent       Education  Patient Education  Education Given To: Patient  Education Provided: Role of Therapy;Precautions  Education Provided Comments: body mechanics, posture education  Education Method: Verbal  Education Outcome: Verbalized understanding      Therapy Time   Individual Concurrent Group Co-treatment   Time In 0825         Time Out 0851         Minutes 26               Timed Code Treatment Minutes: 11      Total Treatment Minutes:  26    Nohemi Rentreia, PT         
MD Heath    Recommended Follow-up, Labs or Other Treatments After Discharge:    ID INFUSION ORDERS:    Daptomycin 500mg iv daily through 5/6/25  Ertapenem 1 gm iv daily through 5/6/25  - Diagnosis   - First dose given in hospital  - Routine CVC care   - Labs: CBC w diff, CMP, ESR, CRP, CK every Mon or Tue, FAX results to [phone]  - Call with antibiotic / infusion issues, [phone]  - Call with any change in status, transfer in or out of a facility or to hospital - [phone]  - Orders only valid for current disposition, NOT transferable upon discharge from facility or new admission to another facility.  - No f/u in outpatient ID office necessary    Robbi Joe MD           
regular lower body clothing?: None  How much help is needed for bathing (which includes washing, rinsing, drying)?: A Little  How much help is needed for toileting (which includes using toilet, bedpan, or urinal)?: None  How much help is needed for putting on and taking off regular upper body clothing?: None  How much help is needed for taking care of personal grooming?: None  How much help for eating meals?: None  AM-[facility] Inpatient Daily Activity Raw Score: 23  AM-PAC Inpatient ADL T-Scale Score : 51.12  ADL Inpatient CMS 0-100% Score: 15.86  ADL Inpatient CMS G-Code Modifier : CI    Goals   No goals indicated      Therapy Time   Individual Concurrent Group Co-treatment   Time In 0827         Time Out 0851         Minutes 24         Timed Code Treatment Minutes: 14 Minutes  Total Treatment Time:24    JOSH Cotto/L 62277     
tests/interventions  Independent review of radiologic images  Microbiology cultures and other micro tests reviewed      Discussed with pt, Neurosurg NP - Debby Joe MD    Recommended Follow-up, Labs or Other Treatments After Discharge:    ID INFUSION ORDERS:    Daptomycin 500mg iv daily through 5/6/25  Ertapenem 1 gm iv daily through 5/6/25  - Diagnosis   - First dose given in hospital  - Routine CVC care   - Labs: CBC w diff, CMP, ESR, CRP, CK every Mon or Tue, FAX results to [phone]  - Call with antibiotic / infusion issues, [phone]  - Call with any change in status, transfer in or out of a facility or to hospital - [phone]  - Orders only valid for current disposition, NOT transferable upon discharge from facility or new admission to another facility.  - No f/u in outpatient ID office necessary    Robbi Joe MD           
indeterminate. Given surgery this could reflect a postoperative epidural hematoma or abscess. There is severe compression of the central canal at    this level with cauda equina nerve root compression.   2. A rim-enhancing fluid collection contiguous with the skin entry site and extending directly to the posterior aspect of the dura, demonstrating rim enhancement, sterility indeterminate. Differential diagnosis includes an abscess versus a postoperative    hematoma.      Electronically signed by DO Mirna Morales, APRN - CNP    NOTE:  This report was transcribed using voice recognition software.  Every effort was made to ensure accuracy; however, inadvertent computerized transcription errors may be present.   
last 72 hours.  Recent Labs     04/04/25  1715 04/05/25  0732   INR 0.95 1.04     No results for input(s): \"CKTOTAL\", \"TROPONINI\" in the last 72 hours.    Urinalysis:      Lab Results   Component Value Date/Time    NITRU Negative 02/26/2025 01:10 AM    WBCUA 22 02/26/2025 01:10 AM    BACTERIA None Seen 02/26/2025 01:10 AM    RBCUA 1 02/26/2025 01:10 AM    BLOODU Negative 02/26/2025 01:10 AM    GLUCOSEU Negative 02/26/2025 01:10 AM       Radiology:  MRI LUMBAR SPINE W WO CONTRAST   Final Result   1. A 14 x 15 mm anterior epidural space fluid collection directly contiguous with the disc space, sterility indeterminate. Given surgery this could reflect a postoperative epidural hematoma or abscess. There is severe compression of the central canal at    this level with cauda equina nerve root compression.   2. A rim-enhancing fluid collection contiguous with the skin entry site and extending directly to the posterior aspect of the dura, demonstrating rim enhancement, sterility indeterminate. Differential diagnosis includes an abscess versus a postoperative    hematoma.      Electronically signed by DO Mirna Morales, APRN - CNP    NOTE:  This report was transcribed using voice recognition software.  Every effort was made to ensure accuracy; however, inadvertent computerized transcription errors may be present.

## 2025-04-08 NOTE — PLAN OF CARE
Problem: Discharge Planning  Goal: Discharge to home or other facility with appropriate resources  4/8/2025 0829 by Rula Menon, RN  Outcome: Progressing     Problem: Pain  Goal: Verbalizes/displays adequate comfort level or baseline comfort level  4/8/2025 0829 by Rula Menon, RN  Outcome: Progressing     Problem: Safety - Adult  Goal: Free from fall injury  Outcome: Progressing

## 2025-04-09 LAB
ALBUMIN: 4.5 G/DL
ALP BLD-CCNC: 64 U/L
ALT SERPL-CCNC: 38 U/L
ANION GAP SERPL CALCULATED.3IONS-SCNC: ABNORMAL MMOL/L
AST SERPL-CCNC: 28 U/L
BACTERIA FLD AEROBE CULT: ABNORMAL
BASOPHILS ABSOLUTE: 69 /ΜL
BASOPHILS RELATIVE PERCENT: 1.3 %
BILIRUB SERPL-MCNC: 0.5 MG/DL (ref 0.1–1.4)
BUN BLDV-MCNC: 11 MG/DL
C-REACTIVE PROTEIN: 31.7
CALCIUM SERPL-MCNC: 9.9 MG/DL
CHLORIDE BLD-SCNC: 103 MMOL/L
CO2: 25 MMOL/L
CREAT SERPL-MCNC: 0.79 MG/DL
EOSINOPHILS ABSOLUTE: 159 /ΜL
EOSINOPHILS RELATIVE PERCENT: 3 %
GFR, ESTIMATED: 91
GLUCOSE BLD-MCNC: 92 MG/DL
GRAM STAIN RESULT: ABNORMAL
HCT VFR BLD CALC: 45.4 % (ref 41–53)
HEMOGLOBIN: 15.2 G/DL (ref 13.5–17.5)
LYMPHOCYTES ABSOLUTE: 1239 /ΜL
LYMPHOCYTES RELATIVE PERCENT: 24.5 %
MCH RBC QN AUTO: 30.6 PG
MCHC RBC AUTO-ENTMCNC: 33.5 G/DL
MCV RBC AUTO: 91.3 FL
MONOCYTES ABSOLUTE: 578 /ΜL
MONOCYTES RELATIVE PERCENT: 10.9 %
NEUTROPHILS ABSOLUTE: 3196 /ΜL
NEUTROPHILS RELATIVE PERCENT: 60.3 %
ORGANISM: ABNORMAL
PDW BLD-RTO: 12.7 %
PLATELET # BLD: 337 K/ΜL
PMV BLD AUTO: 10.8 FL
POTASSIUM SERPL-SCNC: 4.3 MMOL/L
RBC # BLD: 4.97 10^6/ΜL
SED RATE, AUTOMATED: 2
SODIUM BLD-SCNC: 139 MMOL/L
TOTAL CK: 159 U/L
TOTAL PROTEIN: 7.1 G/DL (ref 6.4–8.2)
WBC # BLD: 5.3 10^3/ML

## 2025-04-10 ENCOUNTER — TELEPHONE (OUTPATIENT)
Dept: INFECTIOUS DISEASES | Age: 20
End: 2025-04-10

## 2025-04-10 DIAGNOSIS — L08.9 WOUND INFECTION: Primary | ICD-10-CM

## 2025-04-10 DIAGNOSIS — T14.8XXA WOUND INFECTION: Primary | ICD-10-CM

## 2025-04-11 ENCOUNTER — CLINICAL DOCUMENTATION (OUTPATIENT)
Dept: INFECTIOUS DISEASES | Age: 20
End: 2025-04-11

## 2025-04-11 DIAGNOSIS — T14.8XXA WOUND INFECTION: ICD-10-CM

## 2025-04-11 DIAGNOSIS — L08.9 WOUND INFECTION: ICD-10-CM

## 2025-04-11 LAB — BACTERIA SPEC ANAEROBE CULT: NORMAL

## 2025-04-11 NOTE — TELEPHONE ENCOUNTER
Spoke with Jerzy, clinical pharmacist with [facility], and verified OPAT orders -  Daptomycin 500mg iv daily through 5/6/25  Ertapenem 1 gm iv daily through 5/6/25  CBC w diff, CMP, ESR, CRP, CK every Mon or Tue, FAX results to [phone]  Pt will go weekly to the [facility]    Spoke with pt.  He is doing well.  Walked me through how is doing is IV ATB - he is doing correctly  Pt sees Dr Caban next week.  Wound Vac in place - for now.  Pt states it is causing his back to be \"tichy\" and \"Dr Caban said it can come off tomorrow\"  I encouraged pt to leave wound vac in place as directed by Dr Caban to help close his back wound.  Pt is having diarrhea after he completes his infusions.  I told his this is not uncommon and to add yogurt and a probiotic to his diet to help eliminate the diarrhea  Pt verbalized understanidng

## 2025-04-11 NOTE — PROGRESS NOTES
Dr. Joe has placed a referral order for pharmacist to manage Outpatient Parental Antimicrobial Therapy (OPAT) pursuant the ID Collaborative Practice Agreement.     Pertinent PMH and HPI:  H/o chlamydia     Presents 25 with lower back pain that radiates down L leg x 3 days after playing basketball - dx with sciatica and given pain control     Presents 25 with progressing pain, numbness and tingling - dx with disc herniation and severe stenosis  Had L5/S1 laminectomy and discectomy 25    Presents 3/5/25 and 3/6/25 with bleeding around stiches that \"popped open\" - instructed to see neuro    Presents 3/10/25 with further bleeding - given PO doxy    Presents 25 for wound check        Pertinent Objective Data:    Wt Readings from Last 1 Encounters:   25 88.5 kg (195 lb) (91%, Z= 1.33)*     * Growth percentiles are based on CDC (Boys, 2-20 Years) data.      BMI Readings from Last 1 Encounters:   25 24.37 kg/m² (70%, Z= 0.52)*     * Growth percentiles are based on CDC (Boys, 2-20 Years) data.      SCr= 0.8    Lab Results   Component Value Date    CRP 31.7 (H) 2025       Lab Results   Component Value Date    SEDRATE 2 2025       Lab Results   Component Value Date    CKTOTAL 159 2025       Imagin/4 MRI:  IMPRESSION:  1. A 14 x 15 mm anterior epidural space fluid collection directly contiguous with the disc space, sterility indeterminate. Given surgery this could reflect a postoperative epidural hematoma or abscess. There is severe compression of the central canal at   this level with cauda equina nerve root compression.  2. A rim-enhancing fluid collection contiguous with the skin entry site and extending directly to the posterior aspect of the dura, demonstrating rim enhancement, sterility indeterminate. Differential diagnosis includes an abscess versus a postoperative   hematoma.       Micro:    sx cx: coryne jeikeium in broth     OPAT Orders:  Diagnosis  Lumbar

## 2025-04-14 ENCOUNTER — TELEPHONE (OUTPATIENT)
Dept: INFECTIOUS DISEASES | Age: 20
End: 2025-04-14

## 2025-04-14 LAB
FUNGUS SPEC CULT: NORMAL
LOEFFLER MB STN SPEC: NORMAL

## 2025-04-14 NOTE — TELEPHONE ENCOUNTER
Left  for pt asking for a return call to me to follow up on IV ATB  My name, title, Dr Joe's name, specialty and affiliation  Pt name and +  My direct number [phone]  Office fax [phone]    1400  Received call from [facility] stating pt did not show up for his 1030 appt for labs and dressing change  Mady stated she attempted several times today to contact pt and most went straight to       4/15/25  OPAT Nurse Coordinator Weekly Update Note    Current OPAT plan:  Daptomycin 500mg iv daily through 25  Ertapenem 1 gm iv daily through 25    Diagnosis:  Lumbar SSI    Assessment:  spoke with pt.  He is tolerating the IV ATB well.  He is having a bought of diarrhea after his infusion but it is only once a day.  Pt states back is feeling better. Wound vac was removed.  Pt denies pain or drainage.  Pt was encouraged to contact the [facility] to reschedule his appt missed yesterday to change dressing and draw labs.  Pt stated he was a work all day yesterday and didn't have a chance to call.    Follow up appts:  Neurosurg - pt doesn't remember when this appt is scheduled  Attempted to contact [facility] and the office is currently closed

## 2025-04-15 ENCOUNTER — TELEPHONE (OUTPATIENT)
Dept: INFECTIOUS DISEASES | Age: 20
End: 2025-04-15

## 2025-04-15 NOTE — TELEPHONE ENCOUNTER
Called and spoke to pt's mother  Pt is at work.    Pt took VAC off (per Neurosurg)  Has PICC, doing infusion     Labs 4/9 - CRP 31.7, ESR 2, Cr 0.79  Has f/u tomorrow w Neurosurg

## 2025-04-18 NOTE — TELEPHONE ENCOUNTER
Left vm- vm is confidential Spoke with Jerzy, clinical Pharmacist with [facility]  Pt has not come into the [facility] suite this week to have labs completed or Picc dressing changed

## 2025-04-21 ENCOUNTER — TELEPHONE (OUTPATIENT)
Dept: INFECTIOUS DISEASES | Age: 20
End: 2025-04-21

## 2025-04-21 LAB
FUNGUS SPEC CULT: NORMAL
LOEFFLER MB STN SPEC: NORMAL

## 2025-04-21 NOTE — TELEPHONE ENCOUNTER
Attempted to contact pt - call was dropped   Second attempt was made after I spoke with Debby, nurse with [facility], who stated pt did not show up last week for labs and picc dressing change.  Pt is to be at [facility] at 1030 this am.  Debby will call me back if pt does not show up this week.    After this call I left a  for pt asking for a return call and stressing the importance of labs and dressing change each week  My name, title, Dr Joe's name, specialty and affiliation  Pt name   My direct number [phone]    OPAT Nurse Coordinator Weekly Update Note    Current OPAT plan:  Daptomycin 500mg iv daily through 5/6/25  Ertapenem 1 gm iv daily through 5/6/25    Diagnosis:  Lumbar SSI    Assessment:      Follow up appts: 5/23 TENISHA Bear  at 1400 (called [facility] to get this information)    1231  Received call from [facility] nursing - pt was again a no call no show for todays appt  Nurse stated when she called mom, mom stated, \"I can't make him come to his appts, I can only tell him he has them\"

## 2025-04-28 ENCOUNTER — TELEPHONE (OUTPATIENT)
Dept: INFECTIOUS DISEASES | Age: 20
End: 2025-04-28

## 2025-04-28 LAB
ALBUMIN: 4.6 G/DL
ALP BLD-CCNC: 68 U/L
ALT SERPL-CCNC: 16 U/L
ANION GAP SERPL CALCULATED.3IONS-SCNC: NORMAL MMOL/L
AST SERPL-CCNC: 16 U/L
BASOPHILS ABSOLUTE: 72 /ΜL
BASOPHILS RELATIVE PERCENT: 1.8 %
BILIRUB SERPL-MCNC: 0.6 MG/DL (ref 0.1–1.4)
BUN BLDV-MCNC: 9 MG/DL
C-REACTIVE PROTEIN: 3
CALCIUM SERPL-MCNC: 10 MG/DL
CHLORIDE BLD-SCNC: 104 MMOL/L
CO2: 27 MMOL/L
CREAT SERPL-MCNC: 0.74 MG/DL
EOSINOPHILS ABSOLUTE: 280 /ΜL
EOSINOPHILS RELATIVE PERCENT: 7 %
FUNGUS SPEC CULT: NORMAL
GFR, ESTIMATED: 134
GLUCOSE BLD-MCNC: 87 MG/DL
HCT VFR BLD CALC: 46.3 % (ref 41–53)
HEMOGLOBIN: 15.5 G/DL (ref 13.5–17.5)
LOEFFLER MB STN SPEC: NORMAL
LYMPHOCYTES ABSOLUTE: 1532 /ΜL
LYMPHOCYTES RELATIVE PERCENT: 38.3 %
MCH RBC QN AUTO: 30.6 PG
MCHC RBC AUTO-ENTMCNC: 33.5 G/DL
MCV RBC AUTO: 91.5 FL
MONOCYTES ABSOLUTE: 320 /ΜL
MONOCYTES RELATIVE PERCENT: 8 %
NEUTROPHILS ABSOLUTE: 1796 /ΜL
NEUTROPHILS RELATIVE PERCENT: 44.9 %
PDW BLD-RTO: 12.6 %
PLATELET # BLD: 381 K/ΜL
PMV BLD AUTO: 11.6 FL
POTASSIUM SERPL-SCNC: 4.3 MMOL/L
RBC # BLD: 5.06 10^6/ΜL
SED RATE, AUTOMATED: 2
SODIUM BLD-SCNC: 139 MMOL/L
TOTAL CK: 204 U/L
TOTAL CK: 204 U/L
TOTAL PROTEIN: 7 G/DL (ref 6.4–8.2)
WBC # BLD: 4 10^3/ML

## 2025-04-28 NOTE — TELEPHONE ENCOUNTER
OPAT Nurse Coordinator Weekly Update Note    Current OPAT plan:  Daptomycin 500mg iv daily through 5/6/25  Ertapenem 1 gm iv daily through 5/6/25    Diagnosis:  Lumbar SSI    Assessment:  spoke with pt mother, pt has already left for work today.  Mother stated pt did go to an appt last week.  I let mother know that we have not received labs for a few weeks for pt and it is import to keep the weekly appts with [facility].  Pr runs the risk of developing an infection at the insertion site if proper dressing changes aren't done each week.  Mother verbalized understanding.  She will text pt and ask him to schedule with [facility].    Follow up appts:  Dr Caban 5/23

## 2025-04-30 DIAGNOSIS — T14.8XXA WOUND INFECTION: ICD-10-CM

## 2025-04-30 DIAGNOSIS — L08.9 WOUND INFECTION: ICD-10-CM

## 2025-05-01 DIAGNOSIS — T14.8XXA WOUND INFECTION: ICD-10-CM

## 2025-05-01 DIAGNOSIS — L08.9 WOUND INFECTION: ICD-10-CM

## 2025-05-03 ENCOUNTER — HOSPITAL ENCOUNTER (EMERGENCY)
Age: 20
Discharge: HOME | End: 2025-05-03
Attending: STUDENT IN AN ORGANIZED HEALTH CARE EDUCATION/TRAINING PROGRAM
Payer: COMMERCIAL

## 2025-05-03 ENCOUNTER — RESULTS FOLLOW-UP (OUTPATIENT)
Age: 20
End: 2025-05-03

## 2025-05-03 VITALS
SYSTOLIC BLOOD PRESSURE: 114 MMHG | RESPIRATION RATE: 16 BRPM | BODY MASS INDEX: 24.7 KG/M2 | TEMPERATURE: 98.2 F | DIASTOLIC BLOOD PRESSURE: 77 MMHG | HEART RATE: 78 BPM | OXYGEN SATURATION: 99 % | WEIGHT: 198.63 LBS | HEIGHT: 75 IN

## 2025-05-03 DIAGNOSIS — Z11.3 SCREENING FOR STD (SEXUALLY TRANSMITTED DISEASE): ICD-10-CM

## 2025-05-03 DIAGNOSIS — B35.6 JOCK ITCH: Primary | ICD-10-CM

## 2025-05-03 LAB — TRICHOMONAS #/AREA URNS HPF: NORMAL /[HPF]

## 2025-05-03 PROCEDURE — 81015 MICROSCOPIC EXAM OF URINE: CPT

## 2025-05-03 PROCEDURE — 87591 N.GONORRHOEAE DNA AMP PROB: CPT

## 2025-05-03 PROCEDURE — 99283 EMERGENCY DEPT VISIT LOW MDM: CPT

## 2025-05-03 PROCEDURE — 87491 CHLMYD TRACH DNA AMP PROBE: CPT

## 2025-05-03 RX ORDER — CLOTRIMAZOLE 1 %
CREAM (GRAM) TOPICAL
Qty: 28 G | Refills: 0 | Status: SHIPPED | OUTPATIENT
Start: 2025-05-03

## 2025-05-03 ASSESSMENT — PAIN - FUNCTIONAL ASSESSMENT: PAIN_FUNCTIONAL_ASSESSMENT: NONE - DENIES PAIN

## 2025-05-03 NOTE — DISCHARGE INSTRUCTIONS
If you have worsening symptoms please come back to the emergency department.  Please follow-up with your primary care doctor.  Please follow the results of gonorrhea chlamydia testing online.

## 2025-05-03 NOTE — ED NOTES

## 2025-05-03 NOTE — ED PROVIDER NOTES
[facility] EMERGENCY DEPARTMENT    CHIEF COMPLAINT  Exposure to STD (Pt states that he is unsure if he has and std or not but would like to just get it checked out. Pt denies all other issues at this time. Pt is A&Ox4, UAL, VSS on RA. )       HISTORY OF PRESENT ILLNESS  Cori Rojas is a 19 y.o. male presenting to the ED for STD testing and itching  groin region.  Patient denies urethral discharge, dysuria, testicular pain, abdominal pain, nausea, vomiting, genital lesions.  Patient states he would like STD testing.    - History obtained from: Patient   - Limitations to history: none    I have reviewed the following from the nursing documentation:    Past Medical History:   Diagnosis Date    Asthma     High risk sexual behavior      Past Surgical History:   Procedure Laterality Date    BACK SURGERY N/A 4/5/2025    EXPLORATION AND WASHOUT OF LUMBAR WOUND AND PLACEMENT OF WOUND VAC performed by Abdifatah Caban MD at [facility] OR    LUMBAR SPINE SURGERY N/A 2/27/2025    LUMBAR 5 - SACRAL 1 CENTRAL LAMINECTOMY AND DISCECTOMY performed by Abdifatah Caban MD at [facility] OR     History reviewed. No pertinent family history.  Social History     Socioeconomic History    Marital status: Single     Spouse name: Not on file    Number of children: Not on file    Years of education: Not on file    Highest education level: Not on file   Occupational History    Not on file   Tobacco Use    Smoking status: Never    Smokeless tobacco: Never   Vaping Use    Vaping status: Never Used   Substance and Sexual Activity    Alcohol use: Never    Drug use: Yes     Types: Marijuana (Weed)     Comment: daily    Sexual activity: Not Currently     Birth control/protection: None   Other Topics Concern    Not on file   Social History Narrative    Not on file     Social Drivers of Health     Financial Resource Strain: Not on File (8/26/2019)    Received from [facility]    Financial Resource Strain     Financial Resource Strain: 0   Food Insecurity: No Food

## 2025-05-03 NOTE — ED TRIAGE NOTES
Pt states that he is unsure if he has and std or not but would like to just get it checked out. Pt denies all other issues at this time. Pt is A&Ox4, UAL, VSS on RA.

## 2025-05-04 ENCOUNTER — HOSPITAL ENCOUNTER (EMERGENCY)
Age: 20
Discharge: HOME | End: 2025-05-04
Attending: EMERGENCY MEDICINE
Payer: COMMERCIAL

## 2025-05-04 VITALS
SYSTOLIC BLOOD PRESSURE: 122 MMHG | OXYGEN SATURATION: 100 % | TEMPERATURE: 98.2 F | RESPIRATION RATE: 16 BRPM | DIASTOLIC BLOOD PRESSURE: 79 MMHG | HEART RATE: 64 BPM

## 2025-05-04 DIAGNOSIS — B35.6 JOCK ITCH: Primary | ICD-10-CM

## 2025-05-04 PROCEDURE — 99282 EMERGENCY DEPT VISIT SF MDM: CPT

## 2025-05-04 ASSESSMENT — PAIN - FUNCTIONAL ASSESSMENT: PAIN_FUNCTIONAL_ASSESSMENT: NONE - DENIES PAIN

## 2025-05-04 NOTE — DISCHARGE INSTRUCTIONS
Start using the Lotrimin cream that was prescribed to you.  This appears to be consistent with jock itch or a fungal infection.  There is no signs of herpes.  Your gonorrhea and chlamydia is still pending.  We will contact you for positive results only.

## 2025-05-04 NOTE — ED NOTES
Pt with some dryness in penile area pt aware that he needs to get medication prescribed yesterday from pharmacy to help with his dryness.

## 2025-05-04 NOTE — ED PROVIDER NOTES
[facility] EMERGENCY DEPARTMENT  EMERGENCY DEPARTMENT ENCOUNTER        Pt Name: Cori Rojas  MRN: [id number]  Birthdate 2005  Date of evaluation: 5/4/2025  Provider: Marcella Denny PA-C  PCP: No primary care provider on file.  Note Started: 7:42 PM EDT 5/4/25      SANDY. I have evaluated this patient.        CHIEF COMPLAINT       Chief Complaint   Patient presents with    Exposure to STD     Pt state that he had sex with a girl who later told him she had HSV (herpes) pt with no out break but is having some dry skin that he wants looked at       HISTORY OF PRESENT ILLNESS: 1 or more Elements     History From: patient  Limitations to history : None    Cori Rojas is a 19 y.o. male who presents to the emergency department by private vehicle.  Patient states he has dry irritated skin to his right genital region.  Patient states partner informed him she has HSV.  Partner does not have an active outbreak and takes suppressive therapy.  Patient concerned he may have HSV.  This prompted ED evaluation.  He was seen evaluated here yesterday for the same symptoms.  He had a negative urine trichomonas.  Gonorrhea and Chlamydia are pending.  No other complaints at this time.  He was diagnosed with jock itch and prescribed Lotrimin.  He has not picked up the prescription from the pharmacy.    Nursing Notes were all reviewed and agreed with or any disagreements were addressed in the HPI.    REVIEW OF SYSTEMS :      Review of Systems    Positives and Pertinent negatives as per HPI.     SURGICAL HISTORY     Past Surgical History:   Procedure Laterality Date    BACK SURGERY N/A 4/5/2025    EXPLORATION AND WASHOUT OF LUMBAR WOUND AND PLACEMENT OF WOUND VAC performed by Abdifatah Caban MD at [facility] OR    LUMBAR SPINE SURGERY N/A 2/27/2025    LUMBAR 5 - SACRAL 1 CENTRAL LAMINECTOMY AND DISCECTOMY performed by Abdifatah Caban MD at [facility] OR       CURRENTMEDICATIONS       Discharge Medication List as of 5/4/2025  7:43 PM     Flush bangura with 30cc normal saline daily.

## 2025-05-05 ENCOUNTER — TELEPHONE (OUTPATIENT)
Dept: INFECTIOUS DISEASES | Age: 20
End: 2025-05-05

## 2025-05-05 LAB
C TRACH DNA UR QL NAA+PROBE: NEGATIVE
FUNGUS SPEC CULT: NORMAL
LOEFFLER MB STN SPEC: NORMAL
N GONORRHOEA DNA UR QL NAA+PROBE: NEGATIVE

## 2025-05-05 NOTE — TELEPHONE ENCOUNTER
OPAT End  Call made to pharmacy, left VM for Jerzy clincal pharmacist with [facility].  We are ending IV ATB as planned after cameron on  and his PICC line can be removed at that time.  My name, title, Dr Joe's name, specialty and affiliation  Pt name and +  My direct number [phone]    Call made to patient - attempt to contact pt unsuccessful at this time.  No VM option.  25  Unable to contact pt, phone rings without answer of VM option    Will f/u in 1-2 days to verify line removal  25  Spoke with Jerzy clinical pharmacist with [facility].  According to chart, pt is currently in the [facility] to have PICC removed BUT a note has not yet been placed  Will follow up at a later time to verify PICC was removed

## 2025-05-05 NOTE — TELEPHONE ENCOUNTER
Reviewed case -  Pt had wound dehisence -   Surg debridement 4/5, no purulence, had aggressive debridement  Cult with Corynebacterium in broth (IKER conti) only.    Pt now with healed wound  Last labs 4/28 - ESR 2, CRP 3.0.    Has had Daptomycin / Ertapenem x 4 weeks   Will end rx as scheduled.

## 2025-05-06 PROBLEM — Z11.3 SCREEN FOR STD (SEXUALLY TRANSMITTED DISEASE): Status: RESOLVED | Noted: 2025-04-06 | Resolved: 2025-05-06

## 2025-05-21 ENCOUNTER — HOSPITAL ENCOUNTER (EMERGENCY)
Age: 20
Discharge: HOME | End: 2025-05-21
Attending: EMERGENCY MEDICINE
Payer: COMMERCIAL

## 2025-05-21 VITALS
OXYGEN SATURATION: 100 % | TEMPERATURE: 98.2 F | HEART RATE: 63 BPM | DIASTOLIC BLOOD PRESSURE: 72 MMHG | SYSTOLIC BLOOD PRESSURE: 152 MMHG | RESPIRATION RATE: 20 BRPM

## 2025-05-21 DIAGNOSIS — B35.6 TINEA CRURIS: ICD-10-CM

## 2025-05-21 DIAGNOSIS — Z20.2 ENCOUNTER FOR ASSESSMENT OF STD EXPOSURE: Primary | ICD-10-CM

## 2025-05-21 LAB
BILIRUB UR QL STRIP.AUTO: NEGATIVE
CLARITY UR: CLEAR
COLOR UR: YELLOW
GLUCOSE UR STRIP.AUTO-MCNC: NEGATIVE MG/DL
HGB UR QL STRIP.AUTO: NEGATIVE
KETONES UR STRIP.AUTO-MCNC: NEGATIVE MG/DL
LEUKOCYTE ESTERASE UR QL STRIP.AUTO: NEGATIVE
NITRITE UR QL STRIP.AUTO: NEGATIVE
PH UR STRIP.AUTO: 7.5 [PH] (ref 5–8)
PROT UR STRIP.AUTO-MCNC: NEGATIVE MG/DL
SP GR UR STRIP.AUTO: 1.02 (ref 1–1.03)
TRICHOMONAS #/AREA URNS HPF: NORMAL /[HPF]
UA COMPLETE W REFLEX CULTURE PNL UR: NORMAL
UA DIPSTICK W REFLEX MICRO PNL UR: NORMAL
URN SPEC COLLECT METH UR: NORMAL
UROBILINOGEN UR STRIP-ACNC: 0.2 E.U./DL

## 2025-05-21 PROCEDURE — 87491 CHLMYD TRACH DNA AMP PROBE: CPT

## 2025-05-21 PROCEDURE — 87591 N.GONORRHOEAE DNA AMP PROB: CPT

## 2025-05-21 PROCEDURE — 81001 URINALYSIS AUTO W/SCOPE: CPT

## 2025-05-21 PROCEDURE — 99283 EMERGENCY DEPT VISIT LOW MDM: CPT

## 2025-05-21 NOTE — DISCHARGE INSTRUCTIONS
Follow-up with a primary care physician in 1 to 2 days for reexamination.  Drink plenty of fluids.  If condition worsens or new symptoms develop, return immediately to the emergency department.  Avoid intimate or sexual contact for at least 3 weeks after treatment and until you know that your cultures are negative.  Follow-up with your primary care physician in 2 to 3 days for culture results.  Notify any current or recent sexual partners that they may need to be treated for possible sexually transmitted infection.

## 2025-05-21 NOTE — ED PROVIDER NOTES
[facility] EMERGENCY DEPARTMENT  EMERGENCY DEPARTMENT ENCOUNTER      Pt Name: Cori Rojas  MRN: [id number]  Birthdate 2005  Date of evaluation: 5/21/2025  Provider: MARIUSZ SAXENA DO    CHIEF COMPLAINT       Chief Complaint   Patient presents with    Groin Burn     Patient states his penis itches, denies discharge         HISTORY OF PRESENT ILLNESS   (Location/Symptom, Timing/Onset, Context/Setting, Quality, Duration, Modifying Factors, Severity)  Note limiting factors.   Cori Rojas is a 19 y.o. male who presents to the emergency department with a complaint of itching to the bilateral inguinal areas and the dorsal base of his penis.  Symptoms began approximately 7 days ago.  He is using a over-the-counter cream for \"jock itch\" and reports that it has almost essentially resolved.  He just wanted to make sure that it was nothing else.    He denies any fever or chills.  He denies any painful lesions or blisters.  No ulcerations.  He denies any dysuria hematuria frequency or urgency.  He denies any groin or testicular pain.  No abdominal pain nausea vomiting or diarrhea.    He states that he was tested on May 2 for STDs and shows me results on MyChart which indicated negative gonorrhea chlamydia, negative trichomonas.  He has not had any sexual contact since that time.  He would like to be retested.    Nursing Notes were reviewed.    HPI        REVIEW OF SYSTEMS    (2-9 systems for level 4, 10 or more for level 5)       Constitutional: Negative for fever or chills.     HENT: Negative for rhinorrhea and sore throat.    Eyes: Negative for redness or drainage.     Respiratory: Negative for shortness of breath or dyspnea on exertion.     Cardiovascular: Negative for chest pain.   Gastrointestinal: Negative for abdominal pain.  Negative for vomiting or diarrhea.   Neurological: Negative for headache.    Genitourinary: Negative for flank pain.  Negative for dysuria.  Negative for hematuria.

## 2025-05-21 NOTE — ED NOTES
Patient DCed from ED at this time. Discussed AVS, follow up, and scripts. They verbalized understanding. Reinforced that should symptoms persist or worsen to return to the ED. They verbalized understanding. Patient ambulated out of ED. RN thanked patient for choosing [facility].

## 2025-05-22 ENCOUNTER — RESULTS FOLLOW-UP (OUTPATIENT)
Dept: EMERGENCY DEPT | Age: 20
End: 2025-05-22

## 2025-05-22 LAB
C TRACH DNA UR QL NAA+PROBE: NEGATIVE
N GONORRHOEA DNA UR QL NAA+PROBE: NEGATIVE

## (undated) DEVICE — COVER LT HNDL CAM BLU DISP W/ SURG CTRL

## (undated) DEVICE — LAMINECTOMY: Brand: MEDLINE INDUSTRIES, INC.

## (undated) DEVICE — TOWEL,STOP FLAG GOLD N-W: Brand: MEDLINE

## (undated) DEVICE — SPONGE,NEURO,0.5"X0.5",XR,STRL,10/PK: Brand: MEDLINE

## (undated) DEVICE — PAD,NON-ADHERENT,3X8,STERILE,LF,1/PK: Brand: MEDLINE

## (undated) DEVICE — EYE PROTECTOR FOAM MEDICHOICE

## (undated) DEVICE — TRAP FLUID

## (undated) DEVICE — SUTURE VICRYL + SZ 0 L18IN ABSRB UD L36MM CT-1 1/2 CIR VCP840D

## (undated) DEVICE — 4-PORT MANIFOLD: Brand: NEPTUNE 2

## (undated) DEVICE — DRAPE MICSCP W54XL150IN W/ 4 BINOC GLS LENS LEICA

## (undated) DEVICE — GARMENT,MEDLINE,DVT,INT,CALF,MED, GEN2: Brand: MEDLINE

## (undated) DEVICE — SUTURE MONOCRYL + SZ 4-0 L27IN ABSRB UD L19MM PS-2 3/8 CIR MCP426H

## (undated) DEVICE — GLOVE SURG SZ 7 L12IN FNGR THK79MIL GRN LTX FREE

## (undated) DEVICE — SUTURE VICRYL SZ 2-0 L18IN ABSRB UD CT-1 L36MM 1/2 CIR J839D

## (undated) DEVICE — DRESSING NEG PRESSURE INCISION MGMT SYS 90 CM KIT PREVENA +

## (undated) DEVICE — SPONGE GZ W4XL8IN COT WVN 12 PLY

## (undated) DEVICE — NEPTUNE E-SEP SMOKE EVACUATION PENCIL, COATED, 70MM BLADE, PUSH BUTTON SWITCH: Brand: NEPTUNE E-SEP

## (undated) DEVICE — GLOVE SURG SZ 7.5 L11.73IN FNGR THK7.5MIL STRW LTX POLYMER

## (undated) DEVICE — TOOL MR8-14MH30 MR8 14CM MATCH 3MM: Brand: MIDAS REX MR8

## (undated) DEVICE — LIQUIBAND RAPID ADHESIVE 36/CS 0.8ML: Brand: MEDLINE

## (undated) DEVICE — PROTECTOR ULN NRV PUR FOAM HK LOOP STRP ANATOMICALLY

## (undated) DEVICE — BLANKET WRM W29.9XL79.1IN UP BODY FORC AIR MISTRAL-AIR

## (undated) DEVICE — SOLUTION IV 1000ML 0.9% SOD CHL

## (undated) DEVICE — UNDERGLOVE SURG SZ 8 BLU LTX FREE SYN POLYISOPRENE POLYMER

## (undated) DEVICE — 3M™ TEGADERM™ TRANSPARENT FILM DRESSING FRAME STYLE, 1626W, 4 IN X 4-3/4 IN (10 CM X 12 CM), 50/CT 4CT/CASE: Brand: 3M™ TEGADERM™

## (undated) DEVICE — GLOVE SURG SZ 65 THK91MIL LTX FREE SYN POLYISOPRENE